# Patient Record
Sex: FEMALE | Race: WHITE | Employment: FULL TIME | URBAN - METROPOLITAN AREA
[De-identification: names, ages, dates, MRNs, and addresses within clinical notes are randomized per-mention and may not be internally consistent; named-entity substitution may affect disease eponyms.]

---

## 2022-05-04 ENCOUNTER — HOSPITAL ENCOUNTER (OUTPATIENT)
Dept: PHYSICAL THERAPY | Facility: CLINIC | Age: 72
Setting detail: THERAPIES SERIES
Discharge: HOME | End: 2022-05-04
Payer: MEDICARE

## 2022-05-04 PROCEDURE — 97162 PT EVAL MOD COMPLEX 30 MIN: CPT

## 2022-05-04 PROCEDURE — 97110 THERAPEUTIC EXERCISES: CPT

## 2022-05-04 NOTE — PRE-CERTIFICATION NOTE
Medicare Cap   [] Physical Therapy  [] Speech Therapy  [] Occupational therapy  *PT and Speech caps combine      $2150 Limit for PT and Speech combined  $2150 Limit for OT individually  At the beginning of the month where you expect to go over $2150, please add the [address] [place] [address] modifier      Patient Name: Maritza Groves  YOB: 1950    Note:  This is an estimate of charges billed.      Date of [address] Name # units/ charge $$$ charge Daily Total Charge Ongoing Total $$$   5/04/2022 REY Sykes 1,2 98.01+29.21+22.84 150.06 150.86

## 2022-05-04 NOTE — CONSULTS
[] [address]  [address]  [phone]  F: [phone] [] [address]  [facility]   [address]  P: [phone]  F: [phone] [] [place]  [address] [facility]  P: [phone]  F: [phone] [x] [address]: [phone]  F: [phone] [] [address]  [facility]   [address]   [place]: [phone]  F: [phone]      Physical Therapy Lower Extremity Evaluation    Date:  2022  Patient: Maritza Groves  : 1950  MRN: [id number]  Physician: Marvin Mcclain PA-C    Insurance: Medicare/ [facility] AARP Medicare Supplement (20% co-ins)  Medical Diagnosis: Closed displaced bimalleolar fracture of right lower leg    Rehab Codes: Right ankle pain (M25.571)  Onset date: 2022    Next Dr's appt.: Pending    Subjective:   CC: right ankle pain  HPI: The patient had a fall while walking her dogs on . She suffered a bimalleolar fracture which did not require surgery. She was non weight bearing for 10 weeks and wore a walking boot since mid march. She has been weaning out of it over the last 6 weeks but still has limited walking tolerance. She was walking up to 5 miles per day prior to her injury. She reports having periodic falls over the last year or two with increasing frequency.     PMHx: [] Unremarkable [] Diabetes [x] HTN  [] Pacemaker   [] MI/Heart Problems [] Cancer [] Arthritis [] Other:              [x] Refer to full medical chart  In EPIC       Comorbidities:   [] Obesity [] Dialysis  [] N/A   [] Asthma/COPD [] Dementia [] Other:   [] Stroke [] Sleep apnea [] Other:   [] Vascular disease [] Rheumatic disease [] Other:     Tests: [x] X-Ray: [] MRI:  [] Other:  Not done in the [facility]  Medications: [x] Refer to full medical record [] None [] Other:  Allergies:      [x] Refer to full medical record [] None [] Other:    Function:  Hand Dominance  [x] Right  [] Left  Patient lives with:    In what type of home []  One story   [x] Two story   [] Split level   Number of stairs to enter    With handrail on the [x]  Right to enter   [x] Left to enter   Bathroom has a []  Tub only  [x] Tub/shower combo   [] Walk in shower    []  Grab bars   Washing machine is on []  Main level   [] Second level   [x] [facility] Gerardo Rougon Status [x]  Normal duty   [] Light duty   [] Off due to condition    []  Retired   [] Not employed   [] Disability  [] Other:  []  Return to work:    Work activities/duties        ADL/IADL Previous level of function Current level of function Who currently assists the patient with task   Bathing  [x] Independent  [] Assist [x] Independent  [] Assist    Dress/grooming [x] Independent  [] Assist [x] Independent  [] Assist    Transfer/mobility [x] Independent  [] Assist [x] Independent  [] Assist    Feeding [x] Independent  [] Assist [x] Independent  [] Assist    Toileting [x] Independent  [] Assist [x] Independent  [] Assist    Driving [x] Independent  [] Assist [x] Independent  [] Assist    Housekeeping [x] Independent  [] Assist [x] Independent  [] Assist    Grocery shop/meal prep [x] Independent  [] Assist [x] Independent  [] Assist      Gait Prior level of function Current level of function    [x] Independent  [] Assist [x] Independent  [] Assist   Device: [x] Independent [x] Independent    [] Straight Cane [] Quad cane [] Straight Cane [] Quad cane    [] Standard walker [] Rolling walker   [] 4 wheeled walker [] Standard walker [] Rolling walker   [] 4 wheeled walker    [] Wheelchair [] Wheelchair     Pain:  [x] Yes  [] No Location:right ankle Pain Rating: (0-10 scale) 3/10  Pain altered Tx:  [] Yes  [x] No  Action:    Symptoms: [x] Improving [] Worsening [] Same  Better:  [] AM    [] PM    [] Sit    [] Rise/Sit    []Stand    [] Walk    [] Lying    [] Other:  Worse: [] AM    [] PM    [] Sit    [] Rise/Sit    []Stand    [] Walk    [] Lying    [] Bend                      [] Valsalva    [] Other:  Sleep: [x] OK    [] Disturbed    Objective:    ROM  ° A/P STRENGTH TESTS (+/-) Left Right Not Tested    Left Right Left Right Ant. Drawer   [x]   Hip Flex   5 5 Post. Drawer   [x]   Ext   4- 4- Lachmans   [x]   ER     Valgus Stress   [x]   IR     Varus Stress   [x]   ABD   4- 4- Chuckys   [x]   ADD     Apleys Comp.    [x]   Knee Flex   5 5 Apleys Dist.   [x]   Ext   5 5 Hip Scouring   [x]   Passive Bent knee DF 35 25 5 4- SAVIs   [x]    PF   5 5 Piriformis   [x]   INV   5 5 Stephanies   [x]   EVER   5 5 Talor Tilt   [x]        Pat-Fem Grind   [x]       OBSERVATION No Deficit Deficit Not Tested Comments   Posture    Mild effusion right ankle   Forward Head [] [x] []    Rounded Shoulders [] [x] []    Kyphosis [] [x] []    Lordosis [x] [] []    Lateral Shift [x] [] []    Scoliosis [] [] [x]    Iliac Crest [] [] [x]    PSIS [] [] [x]    ASIS [] [] [x]    Genu Valgus [] [] [x]    Genu Varus [] [] [x]    Genu Recurvatum [] [] [x]    Pronation [x] [] []    Supination [x] [] []    Leg Length Discrp [x] [] []    Slumped Sitting [] [] [x]    Palpation [] [x] [] Tenderness to lateral ankle   Sensation [x] [] []    Edema [] [x] [] Mild effusion   Neurological [x] [] []    Patellar Mobility [] [] [x]    Patellar Orientation [] [] [x]    Gait [] [x] [] Analysis: lacks push off on right       FUNCTION Normal Difficult Unable   Sitting [x] [] []   Standing [] [x] []   Ambulation [] [x] []   Groom/Dress [x] [] []   Lift/Carry [] [x] []   Stairs [] [x] []   Bending [] [x] []   Squat [] [x] []   Kneel [] [x] []     BALANCE/PROPRIOCEPTION              [] Not tested   Single leg stance       R                     L                                PAIN   Eyes open 3              Sec.     >15             Sec                  . []    Eyes closed                          Sec. Sec                  . []        FUNCTIONAL TESTS PAIN NO PAIN COMMENTS   Step Test 4 [] [x]    6 [] [x]    8 [] [x]    Squat [] [x]      Functional Test: LEFI Score: 44/80, 45% functionally impaired       Assessment:   The patient is a 73year old with a chief complaint of right ankle pain and signs and symptoms consistent with a diagnosis of right bimalleolar fracture. She presents with pain, hip and ankle weakness, decreased range of motion, decreased balance, a history of falls and functional limitations. She will benefit from skilled PT to address above deficits. Problems:    [x] ? Pain:  [x] ? ROM:  [x] ? Strength:  [x] ? Function:  [] Other:       STG: (to be met in 6 treatments)  1. ? Pain: 1-2/10  2. ? ROM: 30 degree standing DF  3. Patient to be independent with home exercise program as demonstrated by performance with correct form without cues. 4. Demonstrate Knowledge of fall prevention. LTG: (to be met in 16 treatments)  1. Patient will demonstrate S/L hold > 15 seconds on the right. 2. Patient will be able to walk her dogs > 30 minutes. 3. Patient will resume hiking on trials without any falls. 4. Patient will improve LEFS by > 10%                   Patient goals: To walk faster with less pain    Rehab Potential:  [x] Good  [] Fair  [] Poor   Suggested Professional Referral:  [x] No  [] Yes:  Barriers to Goal Achievement:  [x] No  [] Yes:  Domestic Concerns:  [x] No  [] Yes:    Pt. Education:  [x] Plans/Goals, Risks/Benefits discussed  [x] Home exercise program    Method of Education: [x] Verbal  [x] Demo  [x] Written  Comprehension of Education:  [x] Verbalizes understanding. [] Demonstrates understanding. [x] Needs Review. [] Demonstrates/verbalizes understanding of HEP/Ed previously given.     Treatment Plan:  [x] Therapeutic Exercise   44333  [] Iontophoresis: 4 mg/mL Dexamethasone Sodium Phosphate  mAmin  75576   [] Therapeutic Activity  70252 [x] Vasopneumatic cold with compression  04201    [] Gait Training   95025 [] Ultrasound   28450   [x] Neuromuscular Re-education  95140 [] Electrical Stimulation Unattended  82738   [x] Manual Therapy  19732 [] Electrical Stimulation Attended  08020   [x] Instruction in HEP  [] Lumbar/Cervical Traction  44829   [] Aquatic Therapy   64701 [] Cold/hotpack    [] Massage   40923      [] Dry Needling, 1 or 2 muscles  96156   [] Biofeedback, first 15 minutes   36191  [] Biofeedback, additional 15 minutes   48079 [] Dry Needling, 3 or more muscles  85966         Frequency:  2 x/week for 16 visits        Todays Treatment:  Modalities: PRN  Precautions:   Exercises:  Exercise Reps/ Time Weight/ Level Comments   Bridge x15     Clam x15 ea     Abduction x10     Gastroc stretch 2x20\"     Soleus stretch 2x20\"     Stair calf raise 2x15     3 way reach 2x5 ea                 Other:    HEP:  Bridge 2x15  Clam 2x15  Abduction 2x10  Gastroc stretch 2x20\"  Soleus stretch 2x20\"  Heel raise 2x15  3 way reach 2x5    Specific Instructions for next treatment: Progress hip and ankle strength, ankle ROM, functional strength and balance.       Evaluation Complexity:  History (Personal factors, comorbidities) [] 0 [x] 1-2 [] 3+   Exam (limitations, restrictions) [] 1-2 [x] 3 [] 4+   Clinical presentation (progression) [] Stable [x] Evolving  [] Unstable   Decision Making [] Low [x] Moderate [] High    [] Low Complexity [x] Moderate Complexity [] High Complexity       Treatment Charges: Mins Units   [x] Evaluation       []  Low       [x]  Moderate       []  High 20 1   []  Modalities     [x]  Ther Exercise 30 2   []  Manual Therapy     []  Ther Activities     []  Aquatics     []  Vasocompression     []  Other       TOTAL TREATMENT TIME: 50    Time in: 10:30 AM  Time Out:11:25 AM    Electronically signed by: Rajiv Moreno PT        Physician Signature:________________________________Date:__________________  By signing above or cosigning this note, I have reviewed this plan of care and certify a need for medically necessary rehabilitation services.      *PLEASE SIGN ABOVE AND FAX BACK ALL PAGES*

## 2022-05-04 NOTE — FLOWSHEET NOTE
[facility] Fall Risk Assessment    Patient Name:  Ivan Helms  : 1950    Risk Factor Scale  Score   History of Falls [x] Yes  [] No 25  0 25   Secondary Diagnosis [x] Yes  [] No 15  0 15   Ambulatory Aid [] Furniture  [] Crutches/cane/walker  [x] None/bedrest/wheelchair/nurse 30  15  0    IV/Heparin Lock [] Yes  [x] No 20  0    Gait/Transferring [] Impaired  [] Weak  [x] Normal/bedrest/immobile 20  10  0    Mental Status [] Forgets limitations  [x] Oriented to own ability 15  0       Total:40     Based on the Assessment score: check the appropriate box.     []  No intervention needed   Low =   Score of 0-24    [x]  Use standard prevention interventions Moderate =  Score of 24-44   [x] Give patient handout and discuss fall prevention strategies   [x] Establish goal of education for patient/family RE: fall prevention strategies    []  Use high risk prevention interventions High = Score of 45 and higher   [] Give patient handout and discuss fall prevention strategies   [] Establish goal of education for patient/family Re: fall prevention strategies   [] Discuss lifeline / other resources    Electronically signed by:   Duane Beam, PT  Date: 2022

## 2022-05-11 ENCOUNTER — HOSPITAL ENCOUNTER (OUTPATIENT)
Dept: PHYSICAL THERAPY | Facility: CLINIC | Age: 72
Setting detail: THERAPIES SERIES
Discharge: HOME | End: 2022-05-11
Payer: MEDICARE

## 2022-05-11 PROCEDURE — 97110 THERAPEUTIC EXERCISES: CPT

## 2022-05-11 PROCEDURE — 97112 NEUROMUSCULAR REEDUCATION: CPT

## 2022-05-11 NOTE — FLOWSHEET NOTE
[] [facility]) [facility]  [address]  [phone]  F: [phone] [] [address]  [place] [address]   [address]  P: [phone]  F: [phone] [] [place]  Therapy  [address]  P: [phone]  F: [phone] [x] [address]  P: [phone]  F: [phone] [] [address]  [facility]   [address]   [place]: [phone]  F: [phone]      Physical Therapy Daily Treatment Note    Date:  2022  Patient Name:  Trini Oliveros    :  1950  MRN: [id number]  Physician: Nikki Gautam PA-C                     Insurance: Medicare/ [facility] Medicare Supplement (20% co-ins)  Medical Diagnosis: Closed displaced bimalleolar fracture of right lower leg                        Rehab Codes: Right ankle pain (M25.571)  Visit# / total visits: ; Progress note for Medicare patient due at visit 10     Cancels/No Shows: 0    Subjective:    Pain:  [] Yes  [x] No Location:  N/A Pain Rating: (0-10 scale) 0/10  Pain altered Tx:  [x] No  [] Yes  Action:  Comments: Pt reports no adverse effects from eval.  She got a lace up brace which has been helpful.     Objective:  Modalities:   Precautions:  Exercises:  Exercise Reps/ Time Weight/ Level Comments   Bridge x15       Clam x15 ea       Abduction x10       Gastroc stretch 2x20\"       Soleus stretch 2x20\"       Stair calf raise 2x15       3 way reach 2x5 ea        Seated HS stretch 2 x 20\"       Foam marches 2 x 15     Gait training heel contact  2 x 20 ft  Mirror feedback   Gait training toe off 2 x 20 ft  Mirror feedback    Monster walk  2 x 20 steps  Orange     Other:     HEP:  Bridge 2x15  Clam 2x15  Abduction 2x10  Gastroc stretch 2x20\"  Soleus stretch 2x20\"  Heel raise 2x15  3 way reach 2x5      Treatment Charges: Mins Units   []  Modalities     [x]  Ther Exercise 35 2   []  Manual Therapy     []  Ther Activities     [x]  NMR 25 2   []  Vasocompression     []  Other     Total Treatment time 60 4       Assessment: [x] Progressing toward goals. The patient has continued balance deficits, hip and ankle weakness. Will continue with emphasis. All treatment was well tolerated today. [] No change. [] Other:  [x] Patient would continue to benefit from skilled physical therapy services in order to: The patient is a 73year old with a chief complaint of right ankle pain and signs and symptoms consistent with a diagnosis of right bimalleolar fracture. She presents with pain, hip and ankle weakness, decreased range of motion, decreased balance, a history of falls and functional limitations. She will benefit from skilled PT to address above deficits. STG/LTG:  STG: (to be met in 6 treatments)  1. ? Pain: 1-2/10   Met  2. ? ROM: 30 degree standing DF   Progressing toward goal  3. Patient to be independent with home exercise program as demonstrated by performance with correct form without cues. Progressing toward goal  4. Demonstrate Knowledge of fall prevention. Progressing toward goal     LTG: (to be met in 16 treatments)  1. Patient will demonstrate S/L hold > 15 seconds on the right. Progressing toward goal  2. Patient will be able to walk her dogs > 30 minutes. Progressing toward goal  3. Patient will resume hiking on trials without any falls. Progressing toward goal  4. Patient will improve LEFS by > 10%   Progressing toward goal       Pt. Education:  [x] Yes  [] No  [x] Reviewed Prior HEP/Ed  Method of Education: [x] Verbal  [x] Demo  [x] Written  Comprehension of Education:  [x] Verbalizes understanding. [] Demonstrates understanding. [] Needs review. [] Demonstrates/verbalizes HEP/Ed previously given.      Plan: [x] Continue current frequency toward long and short term goals. [] Specific Instructions for subsequent treatments:Progress hip and ankle strength, ankle ROM, functional strength and balance. Time In: 10:30            Time Out: 11:30    Electronically signed by:  Romie Fleischer, SPT      Evaluation/treatment performed by Student PT under the supervision of co-signing PT who agrees with all evaluation/treatment and documentation.

## 2022-05-11 NOTE — PRE-CERTIFICATION NOTE
Medicare Cap   [] Physical Therapy  [] Speech Therapy  [] Occupational therapy  *PT and Speech caps combine      $2150 Limit for PT and Speech combined  $2150 Limit for OT individually  At the beginning of the month where you expect to go over $2150, please add the [address] [place] modifier      Patient Name: Rapheal Lesch  YOB: 1950    Note:  This is an estimate of charges billed.      Date of [address] Name # units/ charge $$$ charge Daily Total Charge Ongoing Total $$$   5/04/2022 Eval,TE 1,2 98.01+29.21+22.84 150.06 150.86   5/11/2022 TE,NMR 2,2 33.82+26.02+22.84x2 105.52 256.38

## 2022-05-13 ENCOUNTER — HOSPITAL ENCOUNTER (OUTPATIENT)
Dept: PHYSICAL THERAPY | Facility: CLINIC | Age: 72
Setting detail: THERAPIES SERIES
Discharge: HOME | End: 2022-05-13
Payer: MEDICARE

## 2022-05-13 PROCEDURE — 97112 NEUROMUSCULAR REEDUCATION: CPT

## 2022-05-13 PROCEDURE — 97110 THERAPEUTIC EXERCISES: CPT

## 2022-05-13 NOTE — PRE-CERTIFICATION NOTE
Medicare Cap   [] Physical Therapy  [] Speech Therapy  [] Occupational therapy  *PT and Speech caps combine      $2150 Limit for PT and Speech combined  $2150 Limit for OT individually  At the beginning of the month where you expect to go over $2150, please add the [address] [place] 22 modifier      Patient Name: Sage Frances  YOB: 1950    Note:  This is an estimate of charges billed.      Date of [address] Name # units/ charge $$$ charge Daily Total Charge Ongoing Total $$$   5/04/2022 Eval,TE 1,2 98.01+29.21+22.84 150.06 150.86   5/11/2022 TE,NMR 2,2 33.82+26.02+22.84x2 105.52 256.38   5/13/22 TE, NMR 2,1 29.21+22.84+26.02 78.07 334.45

## 2022-05-13 NOTE — FLOWSHEET NOTE
[] [facility]) [facility]  [address]  [phone]  F: [phone] [] [address]  [place] [address]   [address]  P: [phone]  F: [phone] [] [place]  [facility]  [address]  P: [phone]  F: [phone] [x] [address]  P: [phone]  F: [phone] [] [address]  [facility]   [address]   [place]: [phone]  F: [phone]      Physical Therapy Daily Treatment Note    Date:  2022  Patient Name:  Myesha Cramer    :  1950  MRN: [id number]  Physician: Laverne Amin PA-C                     Insurance: Medicare/ [facility] AARP Medicare Supplement (20% co-ins)  Medical Diagnosis: Closed displaced bimalleolar fracture of right lower leg                        Rehab Codes: Right ankle pain (M25.571)  Visit# / total visits: 3/16; Progress note for Medicare patient due at visit 10     Cancels/No Shows: 0    Subjective:    Pain:  [] Yes  [x] No Location: R ankle Pain Rating: (0-10 scale) 0/10  Pain altered Tx:  [x] No  [] Yes  Action:  Comments: Pt reports mild fatigue in B hips after last visit, no c/o pain in R ankle. Pt reports compliance with HEP.      Objective:  Modalities:   Precautions:  Exercises:  Exercise Reps/ Time Weight/ Level Comments    Nustep  6' L5  x   Bridge 2x10     x   Clam 2x10   bilat  x   Abduction 2x10   bilat  x   Gastroc stretch 2x30\"     x   Soleus stretch 2x30\"     x   Stair calf raise 2x15     x   3 way reach 2x5    bilat  x    Seated HS stretch 2 x 20\"     x   Foam marches 2 x 15   x   Gait training heel contact  2 x 20 ft  Mirror feedback x   Gait training toe off 2 x 20 ft  Mirror feedback x    Monster walk  2 x20 steps  [place]   x   Walking marches  2x20 feet   x STS 10x  No UE support x   Other:     HEP:  Bridge 2x15  Clam 2x15  Abduction 2x10  Gastroc stretch 2x20\"  Soleus stretch 2x20\"  Heel raise 2x15  3 way reach 2x5      Treatment Charges: Mins Units   []  Modalities     [x]  Ther Exercise 30 2   []  Manual Therapy     []  Ther Activities     [x]  NMR 15 1   []  Vasocompression     []  Other     Total Treatment time 45 3       Assessment: [x] Progressing toward goals. Continued focusing on B hip strengthening as well as dynamic balance. Added STS without UE support for generalized strengthening with weakness noted in B LEs, however able to complete. Able to increase reps with mat exercises with good tolerance. Pt reports feeling good post interventions this date. [] No change. [] Other:  [x] Patient would continue to benefit from skilled physical therapy services in order to: The patient is a 73year old with a chief complaint of right ankle pain and signs and symptoms consistent with a diagnosis of right bimalleolar fracture. She presents with pain, hip and ankle weakness, decreased range of motion, decreased balance, a history of falls and functional limitations. She will benefit from skilled PT to address above deficits. STG/LTG:  STG: (to be met in 6 treatments)  1. ? Pain: 1-2/10   Met  2. ? ROM: 30 degree standing DF   Progressing toward goal  3. Patient to be independent with home exercise program as demonstrated by performance with correct form without cues. Progressing toward goal  4. Demonstrate Knowledge of fall prevention. Progressing toward goal     LTG: (to be met in 16 treatments)  1. Patient will demonstrate S/L hold > 15 seconds on the right. Progressing toward goal  2. Patient will be able to walk her dogs > 30 minutes. Progressing toward goal  3. Patient will resume hiking on trials without any falls. Progressing toward goal  4. Patient will improve LEFS by > 10%   Progressing toward goal       Pt.  Education:  [x] Yes [] No  [x] Reviewed Prior HEP/Ed  Method of Education: [x] Verbal  [x] Demo  [x] Written  Comprehension of Education:  [x] Verbalizes understanding. [] Demonstrates understanding. [] Needs review. [] Demonstrates/verbalizes HEP/Ed previously given. Plan: [x] Continue current frequency toward long and short term goals. [] Specific Instructions for subsequent treatments:Progress hip and ankle strength, ankle ROM, functional strength and balance.        Time In: 8:00am            Time Out: 8:45am    Electronically signed by:  Kelly Hollis PTA

## 2022-05-16 ENCOUNTER — HOSPITAL ENCOUNTER (OUTPATIENT)
Dept: PHYSICAL THERAPY | Facility: CLINIC | Age: 72
Setting detail: THERAPIES SERIES
Discharge: HOME | End: 2022-05-16
Payer: MEDICARE

## 2022-05-16 PROCEDURE — 97110 THERAPEUTIC EXERCISES: CPT

## 2022-05-16 PROCEDURE — 97112 NEUROMUSCULAR REEDUCATION: CPT

## 2022-05-16 NOTE — FLOWSHEET NOTE
[] [facility] [address] [facility]  [address]  [phone]  F: [phone] [] [address]  [facility]   [address]  P: [phone]  F: [phone] [] [place]  [facility]  [address]  P: [phone]  F: [phone] [x] [address]  P: [phone]  F: [phone] [] [address]  [facility]   [address]   [place]: [phone]  F: [phone]      Physical Therapy Daily Treatment Note    Date:  2022  Patient Name:  Dalila La    :  1950  MRN: [id number]  Physician: Lolsi Castro PA-C                     Insurance: Medicare/ [facility] AARP Medicare Supplement (20% co-ins)  Medical Diagnosis: Closed displaced bimalleolar fracture of right lower leg                        Rehab Codes: Right ankle pain (M25.571)   Visit# / total visits: ; Progress note for Medicare patient due at visit 10     Cancels/No Shows: 0    Subjective:    Pain:  [] Yes  [x] No Location: R ankle Pain Rating: (0-10 scale) 0/10  Pain altered Tx:  [x] No  [] Yes  Action:  Comments: Pt states she was able to go hiking with her ankle brace over the weekend with only minimal soreness in R ankle.      Objective:  Modalities:   Precautions:  Exercises:  Exercise Reps/ Time Weight/ Level Comments    Nustep  7' L5  x   Bridge 2x10     x   Clam 2x10   bilat  x   Abduction 2x10   bilat  x   Gastroc stretch 2x30\"     x   Soleus stretch 2x30\"     x   Stair calf raise 2x15     x   3 way reach 2x8    bilat  x   Standing HS stretch 2x30\"     x   Foam marches 2 x 15   x   Gait training heel contact  2 x 20 ft  Mirror feedback -   Gait training toe off 2 x 20 ft  Mirror feedback -   Monster walk 2x20 steps  Orange   x   STS 2x10  No UE support x SLS                     Other:     HEP:  Bridge 2x15  Clam 2x15  Abduction 2x10  Gastroc stretch 2x20\"  Soleus stretch 2x20\"  Heel raise 2x15  3 way reach 2x5      Treatment Charges: Mins Units   []  Modalities     [x]  Ther Exercise 30 2   []  Manual Therapy     []  Ther Activities     [x]  NMR 15 1   []  Vasocompression     []  Other     Total Treatment time 45 3       Assessment: [x] Progressing toward goals. Initiated tx with Nustep followed by B gastroc/soleus stretches. Pt then completed circuit type strengthening: 3 way reach, STS, monster walks, and HS stretches with good guanakito. Pt then took a short rest break and completed second circuit including stair calf raises, SLS, and march on foam. Ended tx with mat exercises; again focusing on hip strength. [] No change. [] Other:  [x] Patient would continue to benefit from skilled physical therapy services in order to: The patient is a 73year old with a chief complaint of right ankle pain and signs and symptoms consistent with a diagnosis of right bimalleolar fracture. She presents with pain, hip and ankle weakness, decreased range of motion, decreased balance, a history of falls and functional limitations. She will benefit from skilled PT to address above deficits. STG/LTG:  STG: (to be met in 6 treatments)  1. ? Pain: 1-2/10   Met  2. ? ROM: 30 degree standing DF   Progressing toward goal  3. Patient to be independent with home exercise program as demonstrated by performance with correct form without cues. Progressing toward goal  4. Demonstrate Knowledge of fall prevention. Progressing toward goal     LTG: (to be met in 16 treatments)  1. Patient will demonstrate S/L hold > 15 seconds on the right. Progressing toward goal  2. Patient will be able to walk her dogs > 30 minutes. Progressing toward goal  3. Patient will resume hiking on trials without any falls. Progressing toward goal  4.  Patient will improve LEFS by > 10%   Progressing toward goal       Pt. Education:  [x] Yes  [] No  [x] Reviewed Prior HEP/Ed  Method of Education: [x] Verbal  [x] Demo  [x] Written  Comprehension of Education:  [x] Verbalizes understanding. [] Demonstrates understanding. [] Needs review. [] Demonstrates/verbalizes HEP/Ed previously given. Plan: [x] Continue current frequency toward long and short term goals. [x] Specific Instructions for subsequent treatments:Progress hip and ankle strength, ankle ROM, functional strength and balance.        Time In: 8:00am            Time Out: 8:45am    Electronically signed by:  Flaco Waldrop PTA

## 2022-05-25 ENCOUNTER — HOSPITAL ENCOUNTER (OUTPATIENT)
Dept: PHYSICAL THERAPY | Facility: CLINIC | Age: 72
Setting detail: THERAPIES SERIES
Discharge: HOME | End: 2022-05-25
Payer: MEDICARE

## 2022-05-25 PROCEDURE — 97110 THERAPEUTIC EXERCISES: CPT

## 2022-05-25 PROCEDURE — 97112 NEUROMUSCULAR REEDUCATION: CPT

## 2022-05-25 NOTE — PRE-CERTIFICATION NOTE
Medicare Cap   [x] Physical Therapy  [] Speech Therapy  [] Occupational therapy  *PT and Speech caps combine      $2150 Limit for PT and Speech combined  $2150 Limit for OT individually  At the beginning of the month where you expect to go over $2150, please add the [address] [place] modifier      Patient Name: Cristel Rosaura: 1950    Note:  This is an estimate of charges billed.      Date of [address] Name # units/ charge $$$ charge Daily Total Charge Ongoing Total $$$   5/04/2022 Eval,TE 1,2 98.01+29.21+22.84 150.06 150.86   5/11/2022 TE,NMR 2,2 33.82+26.02+22.84x2 105.52 256.38   5/13/22 TE, NMR 2,1 29.21+22.84+26.02 78.07 334.45   5/16/22 TE, NMR 2,1 29.21+22.84+26.02 78.07 412.52   5/25/22 TE, NMR 3, 1 33.82+22.84+22.84+22.84 102.34 514.86

## 2022-05-25 NOTE — FLOWSHEET NOTE
[] [facility]) [facility]  [facility]  [address]  [phone]  F: [phone] [] [address]  [place] [address]   [address]  P: [phone]  F: [phone] [] [place]  [facility]  [address]  P: [phone]  F: [phone] [x] [address]  P: [phone]  F: [phone] [] [address]  [facility]   [address]   [place]: [phone]  F: [phone]      Physical Therapy Daily Treatment Note    Date:  2022  Patient Name:  Dolores Scherer    :  1950  MRN: [id number]  Physician: Que Hein PA-C                     Insurance: Medicare/ [facility] AAR Medicare Supplement (20% co-ins)  Medical Diagnosis: Closed displaced bimalleolar fracture of right lower leg                        Rehab Codes: Right ankle pain (M25.571)   Visit# / total visits: ; Progress note for Medicare patient due at visit 10     Cancels/No Shows: 0    Subjective:    Pain:  [] Yes  [x] No Location: R ankle Pain Rating: (0-10 scale) 0/10  Pain altered Tx:  [x] No  [] Yes  Action:  Comments: Pt states she was able to go hiking during her trip with use of brace and walking stick. Pt was able to walk up to 7 miles a day during her trip.     Objective:  Modalities:   Precautions:  Exercises:  Exercise Reps/ Time Weight/ Level Comments    Nustep  7' L5  x   Bridge 2x10     x   Clam 2x10   bilat  x   Abduction 2x10   bilat  x   Gastroc stretch 2x30\"     x   Soleus stretch 2x30\"     x   Stair calf raise 2x15     x   3 way reach 2x8    bilat     Standing HS stretch 2x30\"     x   Foam marches 2 x 15   x   Hip abd 2 x 15  On foam  x   Hip ext 2 x 15  On foam x          Gait training heel contact  2 x 20 ft  Mirror feedback -   Gait training toe off 2 x 20 ft  Mirror feedback -   Monster walk 2x20 steps  Orange   x   Banded lateral walks 2 x 20 steps Orange  x   STS 2x10  No UE support    SLS                     Other: Standing posterior talus mob grade IV with strap and right foot on step     HEP:  Bridge 2x15  Clam 2x15  Abduction 2x10  Gastroc stretch 2x20\"  Soleus stretch 2x20\"  Heel raise 2x15  3 way reach 2x5      Treatment Charges: Mins Units   []  Modalities     [x]  Ther Exercise 42 3   [x]  Manual Therapy 3 NC   []  Ther Activities     [x]  NMR 15 1   []  Vasocompression     []  Other     Total Treatment time 60 4       Assessment: [x] Progressing toward goals. Pt tolerated treatment well with no pain. Addition of banded lateral walks and hip strengthening on foam to increase strength and increase balance. Addition of posterior talus mob to increase DF as pt reports difficulty with descending stairs. [] No change. [] Other:  [x] Patient would continue to benefit from skilled physical therapy services in order to: The patient is a 73year old with a chief complaint of right ankle pain and signs and symptoms consistent with a diagnosis of right bimalleolar fracture. She presents with pain, hip and ankle weakness, decreased range of motion, decreased balance, a history of falls and functional limitations. She will benefit from skilled PT to address above deficits. STG/LTG:  STG: (to be met in 6 treatments)  1. ? Pain: 1-2/10   Met  2. ? ROM: 30 degree standing DF   Progressing toward goal  3. Patient to be independent with home exercise program as demonstrated by performance with correct form without cues. Progressing toward goal  4. Demonstrate Knowledge of fall prevention. Progressing toward goal     LTG: (to be met in 16 treatments)  1. Patient will demonstrate S/L hold > 15 seconds on the right. Progressing toward goal  2. Patient will be able to walk her dogs > 30 minutes. Progressing toward goal  3.  Patient will resume hiking on trials without any falls. Progressing toward goal  4. Patient will improve LEFS by > 10%   Progressing toward goal       Pt. Education:  [x] Yes  [] No  [x] Reviewed Prior HEP/Ed  Method of Education: [x] Verbal  [x] Demo  [x] Written  Comprehension of Education:  [x] Verbalizes understanding. [] Demonstrates understanding. [] Needs review. [] Demonstrates/verbalizes HEP/Ed previously given. Plan: [x] Continue current frequency toward long and short term goals. [x] Specific Instructions for subsequent treatments:Progress hip and ankle strength, ankle ROM, functional strength and balance. Time In: 10:30am            Time Out: 11:30am    Electronically signed by:  DAYNE Nunez      Evaluation/treatment performed by Student PT under the supervision of co-signing PT who agrees with all evaluation/treatment and documentation.

## 2022-05-27 ENCOUNTER — HOSPITAL ENCOUNTER (OUTPATIENT)
Dept: PHYSICAL THERAPY | Facility: CLINIC | Age: 72
Setting detail: THERAPIES SERIES
Discharge: HOME | End: 2022-05-27
Payer: MEDICARE

## 2022-05-27 PROCEDURE — 97110 THERAPEUTIC EXERCISES: CPT

## 2022-05-27 PROCEDURE — 97112 NEUROMUSCULAR REEDUCATION: CPT

## 2022-05-27 NOTE — FLOWSHEET NOTE
[] [address]  [address]  [phone]  F: [phone] [] [address]  [facility]   [address]  P: [phone]  F: [phone] [] [address]  [address]  P: [phone]  F: [phone] [x] [address]  P: [phone]  F: [phone] [] [address]  [facility]   [address]   [place]: [phone]  F: [phone]      Physical Therapy Daily Treatment Note    Date:  2022  Patient Name:  Nancy Romano    :  1950  MRN: [id number]  Physician: Cam Young PA-C                     Insurance: Medicare/ [facility] AARP Medicare Supplement (20% co-ins)  Medical Diagnosis: Closed displaced bimalleolar fracture of right lower leg                        Rehab Codes: Right ankle pain (M25.571)   Visit# / total visits: ; Progress note for Medicare patient due at visit 10     Cancels/No Shows: 0    Subjective:    Pain:  [] Yes  [x] No Location: R ankle Pain Rating: (0-10 scale) 0/10  Pain altered Tx:  [x] No  [] Yes  Action:  Comments: Pt denies pain upon arrival.      Objective:  Modalities:   Precautions:  Exercises:  Exercise Reps/ Time Weight/ Level Comments    Nustep  7' L5  x   Bridge 2x10     -   Clam 2x10   bilat  -   Abduction 2x10   bilat  -   Gastroc stretch 2x30\"     x   Soleus stretch 2x30\"     x   Stair calf raise 2x15     x   3 way reach 2x8    bilat     Standing HS stretch 2x30\"     x   Foam marches 2 x 15   x   Hip abd 2 x 15  On foam  x   Hip ext 2 x 15  On foam x          Gait training heel contact  2 x 20 ft  Mirror feedback -   Gait training toe off 2 x 20 ft  Mirror feedback -   Monster walk 2x20 steps  Orange   x   Banded lateral walks 2 x 20 steps Rhea  x   STS 2x10  No UE support    SLS 3x30\"   x                 Other: Standing posterior talus mob grade III with strap and right foot on step; long sitting distraction and A/P Grade III mobes      HEP:  Bridge 2x15  Clam 2x15  Abduction 2x10  Gastroc stretch 2x20\"  Soleus stretch 2x20\"  Heel raise 2x15  3 way reach 2x5      Treatment Charges: Mins Units   []  Modalities     [x]  Ther Exercise 25 2   [x]  Manual Therapy 3 NC   []  Ther Activities     [x]  NMR 10 1   []  Vasocompression     []  Other     Total Treatment time 38 3       Assessment: [x] Progressing toward goals. Continued with Talocrual joint mobilizations with slight improvement descending 6\" stairs in clinic. Pt then completed hip strengthening exercises; able to increase to lime tband with lateral band walks and monster walks. Continued with static and dynamic balance and proprioception exercises with good tolerance. Pt continues to demo decreased balance on R with SLS compared to L.     [] No change. [] Other:  [x] Patient would continue to benefit from skilled physical therapy services in order to: The patient is a 73year old with a chief complaint of right ankle pain and signs and symptoms consistent with a diagnosis of right bimalleolar fracture. She presents with pain, hip and ankle weakness, decreased range of motion, decreased balance, a history of falls and functional limitations. She will benefit from skilled PT to address above deficits. STG/LTG:  STG: (to be met in 6 treatments)  1. ? Pain: 1-2/10   Met  2. ? ROM: 30 degree standing DF   Progressing toward goal  3. Patient to be independent with home exercise program as demonstrated by performance with correct form without cues. Progressing toward goal  4. Demonstrate Knowledge of fall prevention. Progressing toward goal     LTG: (to be met in 16 treatments)  1. Patient will demonstrate S/L hold > 15 seconds on the right. Progressing toward goal  2.  Patient will be able to walk her dogs > 30 minutes. Progressing toward goal  3. Patient will resume hiking on trials without any falls. Progressing toward goal  4. Patient will improve LEFS by > 10%   Progressing toward goal       Pt. Education:  [x] Yes  [] No  [x] Reviewed Prior HEP/Ed  Method of Education: [x] Verbal  [x] Demo  [x] Written  Comprehension of Education:  [x] Verbalizes understanding. [] Demonstrates understanding. [] Needs review. [] Demonstrates/verbalizes HEP/Ed previously given. Plan: [x] Continue current frequency toward long and short term goals. [x] Specific Instructions for subsequent treatments:Progress hip and ankle strength, ankle ROM, functional strength and balance. Time In: 8:00am            Time Out: 8:40am    Electronically signed by:  Sadiq Edouard PTA      Evaluation/treatment performed by Student PT under the supervision of co-signing PT who agrees with all evaluation/treatment and documentation.

## 2022-06-01 ENCOUNTER — HOSPITAL ENCOUNTER (OUTPATIENT)
Dept: PHYSICAL THERAPY | Facility: CLINIC | Age: 72
Setting detail: THERAPIES SERIES
Discharge: HOME | End: 2022-06-01
Payer: MEDICARE

## 2022-06-01 PROCEDURE — 97112 NEUROMUSCULAR REEDUCATION: CPT

## 2022-06-01 PROCEDURE — 97110 THERAPEUTIC EXERCISES: CPT

## 2022-06-01 NOTE — FLOWSHEET NOTE
[] [address]  [address]  [phone]  F: [phone] [] [address]  [facility]   [address]  P: [phone]  F: [phone] [] [place] [address]  Therapy  [address] [facility]  P: [phone]  F: [phone] [x] [address]  P: [phone]  F: [phone] [] [address]  [facility]   [address]   [place]: [phone]  F: [phone]      Physical Therapy Daily Treatment Note    Date:  2022  Patient Name:  Kira Olmos    :  1950  MRN: [id number]  Physician: Moy Hernandez PA-C                     Insurance: Medicare/ [facility] AARP Medicare Supplement (20% co-ins)  Medical Diagnosis: Closed displaced bimalleolar fracture of right lower leg                        Rehab Codes: Right ankle pain (M25.571)   Visit# / total visits: ; Progress note for Medicare patient due at visit 10     Cancels/No Shows: 0    Subjective:    Pain:  [] Yes  [x] No Location: R ankle Pain Rating: (0-10 scale) 2/10  Pain altered Tx:  [x] No  [] Yes  Action:  Comments: Pt reports pain in right ankle that she attributes to prolong time on feet while gardening this weekend.     Objective:  Modalities:   Precautions:  Exercises:  Exercise Reps/ Time Weight/ Level Comments    Nustep  7' L5  x   Bridge 2x10     -   Clam 2x10   bilat  -   Abduction 2x10   bilat  -   Gastroc stretch 2x30\"     x   Soleus stretch 2x30\"     x   Stair calf raise 2x15     x   3 way reach 2x5   bilat  x   Standing HS stretch 2x30\"     x   Foam marches x 15  On foam x   Hip abd x 15  On foam  x   Hip ext x 15  On foam x          Gait training heel contact  2 x 20 ft  Mirror feedback -   Gait training toe off 2 x 20 ft  Mirror feedback -   Monster walk 2x20 steps  Orange   x   Banded lateral walks 2 x 20 steps Orange  x   STS 2x10  No UE support    SLS 2x30\"   x   Lunge onto bosu 2 x 10  bilat x          Other: Standing posterior talus mob grade III with strap and right foot on step; long sitting distraction and A/P Grade III mobes      HEP:  Bridge 2x15  Clam 2x15  Abduction 2x10  Gastroc stretch 2x20\"  Soleus stretch 2x20\"  Heel raise 2x15  3 way reach 2x5      Treatment Charges: Mins Units   []  Modalities     [x]  Ther Exercise 35 2   []  Manual Therapy     []  Ther Activities     [x]  NMR 10 1   []  Vasocompression     []  Other     Total Treatment time 45 3       Assessment: [x] Progressing toward goals. Pt tolerated treatment well with no pain throughout. Addition of lunge onto bosu to increase ankle stability. Continued with LE strengthening and balance activities. [] No change. [] Other:  [x] Patient would continue to benefit from skilled physical therapy services in order to: The patient is a 73year old with a chief complaint of right ankle pain and signs and symptoms consistent with a diagnosis of right bimalleolar fracture. She presents with pain, hip and ankle weakness, decreased range of motion, decreased balance, a history of falls and functional limitations. She will benefit from skilled PT to address above deficits. STG/LTG:  STG: (to be met in 6 treatments)  1. ? Pain: 1-2/10   Met  2. ? ROM: 30 degree standing DF   Progressing toward goal  3. Patient to be independent with home exercise program as demonstrated by performance with correct form without cues. Progressing toward goal  4. Demonstrate Knowledge of fall prevention. Progressing toward goal     LTG: (to be met in 16 treatments)  1. Patient will demonstrate S/L hold > 15 seconds on the right. Progressing toward goal  2. Patient will be able to walk her dogs > 30 minutes. Progressing toward goal  3.  Patient will resume hiking on trials without any falls.   Progressing toward goal  4. Patient will improve LEFS by > 10%   Progressing toward goal       Pt. Education:  [x] Yes  [] No  [x] Reviewed Prior HEP/Ed  Method of Education: [x] Verbal  [x] Demo  [x] Written  Comprehension of Education:  [x] Verbalizes understanding. [] Demonstrates understanding. [] Needs review. [] Demonstrates/verbalizes HEP/Ed previously given. Plan: [x] Continue current frequency toward long and short term goals. [x] Specific Instructions for subsequent treatments:Progress hip and ankle strength, ankle ROM, functional strength and balance. Time In: 10:30am            Time Out: 11:15am    Electronically signed by:  DAYNE Thomas      Evaluation/treatment performed by Student PT under the supervision of co-signing PT who agrees with all evaluation/treatment and documentation.

## 2022-06-01 NOTE — PRE-CERTIFICATION NOTE
Medicare Cap   [x] Physical Therapy  [] Speech Therapy  [] Occupational therapy  *PT and Speech caps combine      $2150 Limit for PT and Speech combined  $2150 Limit for OT individually  At the beginning of the month where you expect to go over $2150, please add the [address] [place] modifier      Patient Name: Wilian Edmond: 1950    Note:  This is an estimate of charges billed.      Date of [address] Name # units/ charge $$$ charge Daily Total Charge Ongoing Total $$$   2022 Eval,TE 1,2 98.01+29.21+22.84 150.06 150.86   2022 TE,NMR 2,2 33.82+26.02+22.84x2 105.52 256.38   22 TE, NMR 2,1 29.21+22.84+26.02 78.07 334.45   22 TE, NMR 2,1 29.21+22.84+26.02 78.07 412.52   22 TE, NMR 3, 1 33.82+22.84+22.84+22.84 102.34 514.86   22 TE, NMR 2+1 29.21+22.84+26.02 78.07 592.93   22 TE, NMR 2+1 29.21+22.84+26.02 78.07 671.00

## 2022-06-03 ENCOUNTER — HOSPITAL ENCOUNTER (OUTPATIENT)
Dept: PHYSICAL THERAPY | Facility: CLINIC | Age: 72
Setting detail: THERAPIES SERIES
Discharge: HOME | End: 2022-06-03
Payer: MEDICARE

## 2022-06-03 PROCEDURE — 97110 THERAPEUTIC EXERCISES: CPT

## 2022-06-03 PROCEDURE — 97112 NEUROMUSCULAR REEDUCATION: CPT

## 2022-06-03 NOTE — PRE-CERTIFICATION NOTE
Medicare Cap   [x] Physical Therapy  [] Speech Therapy  [] Occupational therapy  *PT and Speech caps combine      $2150 Limit for PT and Speech combined  $2150 Limit for OT individually  At the beginning of the month where you expect to go over $2150, please add the [address] [place] 22 modifier      Patient Name: Cristel Rosaura: 1950    Note:  This is an estimate of charges billed.      Date of [address] Name # units/ charge $$$ charge Daily Total Charge Ongoing Total $$$   5/04/2022 Eval,TE 1,2 98.01+29.21+22.84 150.06 150.86   5/11/2022 TE,NMR 2,2 33.82+26.02+22.84x2 105.52 256.38   5/13/22 TE, NMR 2,1 29.21+22.84+26.02 78.07 334.45   5/16/22 TE, NMR 2,1 29.21+22.84+26.02 78.07 412.52   5/25/22 TE, NMR 3, 1 33.82+22.84+22.84+22.84 102.34 514.86   5/27/22 TE, NMR 2+1 29.21+22.84+26.02 78.07 592.93   6/1/22 TE, NMR 2+1 29.21+22.84+26.02 78.07 671.00   6/3 TE, NMR 2+1  78.07 749.07

## 2022-06-03 NOTE — FLOWSHEET NOTE
[] [facility] [address]  [address]  [phone]  F: [phone] [] [address]  [facility]   [address]  P: [phone]  F: [phone] [] [place]  [facility]  [address]  P: [phone]  F: [phone] [x] [address]  P: [phone]  F: [phone] [] [address]  [facility]   [address]   [place]: [phone]  F: [phone]      Physical Therapy Daily Treatment Note    Date:  6/3/2022  Patient Name:  Deisy Torres    :  1950  MRN: [id number]  Physician: Javan Barajas PA-C                     Insurance: Medicare/ [facility] AARP Medicare Supplement (20% co-ins)  Medical Diagnosis: Closed displaced bimalleolar fracture of right lower leg                        Rehab Codes: Right ankle pain (M25.571)   Visit# / total visits: ; Progress note for Medicare patient due at visit 10     Cancels/No Shows: 0    Subjective:    Pain:  [] Yes  [x] No Location: R ankle Pain Rating: (0-10 scale) 2/10  Pain altered Tx:  [x] No  [] Yes  Action:  Comments: Pt reported that she is doing well and that her ankle has minor pain and stated that she was gardening before appt.      Objective:  Modalities:   Precautions:  Exercises:  Exercise Reps/ Time Weight/ Level Comments    Nustep  7' L5  x   Bridge 2x10     -   Clam 2x10   bilat  -   Abduction 2x10   bilat  -   Gastroc stretch 2x30\"     x   Soleus stretch 2x30\"     x   Stair calf raise 2x15     x   3 way reach 2x5   bilat  x   Standing HS stretch 2x30\"     x   Foam marches 15x2  On foam x   Hip abd 15x2  On foam  x   Hip ext 15x2  On foam x          Gait training heel contact  2 x 20 ft  Mirror feedback -   Gait training toe off 2 x 20 ft  Mirror feedback - Monster walk 2x20 steps  Orange   x   Banded lateral walks 2 x 20 steps Orange  x   STS 2x10  No UE support    SLS 2x30\"   x   Lunge onto bosu 2 x 10  bilat x          Other: Standing posterior talus mob grade III with strap and right foot on step; long sitting distraction and A/P Grade III mobes      HEP:  Bridge 2x15  Clam 2x15  Abduction 2x10  Gastroc stretch 2x20\"  Soleus stretch 2x20\"  Heel raise 2x15  3 way reach 2x5      Treatment Charges: Mins Units   []  Modalities     [x]  Ther Exercise 30 2   []  Manual Therapy     []  Ther Activities     [x]  NMR 10 1   []  Vasocompression     []  Other     Total Treatment time 40 3       Assessment: [x] Progressing toward goals. Pt with good tolerance to progressions with no reported increase in pain. Progressed reps for standing activities to improve LE strength and endurance. [] No change. [] Other:  [x] Patient would continue to benefit from skilled physical therapy services in order to: The patient is a 73year old with a chief complaint of right ankle pain and signs and symptoms consistent with a diagnosis of right bimalleolar fracture. She presents with pain, hip and ankle weakness, decreased range of motion, decreased balance, a history of falls and functional limitations. She will benefit from skilled PT to address above deficits. STG/LTG:  STG: (to be met in 6 treatments)  1. ? Pain: 1-2/10   Met  2. ? ROM: 30 degree standing DF   Progressing toward goal  3. Patient to be independent with home exercise program as demonstrated by performance with correct form without cues. Progressing toward goal  4. Demonstrate Knowledge of fall prevention. Progressing toward goal     LTG: (to be met in 16 treatments)  1. Patient will demonstrate S/L hold > 15 seconds on the right. Progressing toward goal  2. Patient will be able to walk her dogs > 30 minutes. Progressing toward goal  3.  Patient will resume hiking on trials without any falls.   Progressing toward goal  4. Patient will improve LEFS by > 10%   Progressing toward goal       Pt. Education:  [x] Yes  [] No  [x] Reviewed Prior HEP/Ed  Method of Education: [x] Verbal  [x] Demo  [x] Written  Comprehension of Education:  [x] Verbalizes understanding. [] Demonstrates understanding. [] Needs review. [] Demonstrates/verbalizes HEP/Ed previously given. Plan: [x] Continue current frequency toward long and short term goals. [x] Specific Instructions for subsequent treatments:Progress hip and ankle strength, ankle ROM, functional strength and balance.        Time In: 3204            Time Out: 3143    Electronically signed by:  Cristhian Carrillo PTA

## 2022-06-06 ENCOUNTER — HOSPITAL ENCOUNTER (OUTPATIENT)
Dept: PHYSICAL THERAPY | Facility: CLINIC | Age: 72
Setting detail: THERAPIES SERIES
Discharge: HOME | End: 2022-06-06
Payer: MEDICARE

## 2022-06-06 PROCEDURE — 97112 NEUROMUSCULAR REEDUCATION: CPT

## 2022-06-06 PROCEDURE — 97110 THERAPEUTIC EXERCISES: CPT

## 2022-06-06 NOTE — FLOWSHEET NOTE
[] [address]  [address]  [phone]  F: [phone] [] [address]  [place] [address]   [address]  P: [phone]  F: [phone] [] [place]  [facility]  [address]  P: [phone]  F: [phone] [x] [address]  P: [phone]  F: [phone] [] [address]  [facility]   [address]   [place]: [phone]  F: [phone]      Physical Therapy Daily Treatment Note    Date:  2022  Patient Name:  Ludy Amaya    :  1950  MRN: [id number]  Physician: Karsten Arteaga PA-C                     Insurance: Medicare/ [facility] AARP Medicare Supplement (20% co-ins)  Medical Diagnosis: Closed displaced bimalleolar fracture of right lower leg                        Rehab Codes: Right ankle pain (M25.571)   Visit# / total visits: ; Progress note for Medicare patient due at visit 10     Cancels/No Shows: 0    Subjective:    Pain:  [] Yes  [x] No Location: R ankle Pain Rating: (0-10 scale) 0/10  Pain altered Tx:  [x] No  [] Yes  Action:  Comments: Pt states she had some pain in lateral ankle that lasted a few hours. Pt reports she is going hiking this week and bought higher boots and a walking stick to utilize.      Objective:  Modalities:   Precautions:  Exercises:  Exercise Reps/ Time Weight/ Level Comments    Nustep  5' L6  x   Bridge 2x10     -   Clam 2x10   bilat  -   Abduction 2x10   bilat  -   Gastroc stretch 2x30\"     x   Soleus stretch 2x30\"     x   Stair calf raise 2x15     x   3 way reach 2x5   bilat  x   Standing HS stretch 2x30\"     x   Foam marches x15  On foam x   Hip abd x15  On foam  x   Hip ext x15  On foam x          Gait training heel contact  2 x 20 ft  Mirror feedback -   Gait training toe off 2 x 20 ft  Mirror feedback -   Monster walk 2x20 steps  Orange   x   Banded lateral walks 2 x 20 steps Orange  x   STS 2x10  No UE support    SLS 2x30\"   x   Lunge onto bosu 2 x 10  bilat x   Lunges x5 ea  In // bars x          Dynamic warm up       Knee hug 2x20'   x   HS scoop 2x20'   x   March 2x20'   x   Side lunge 2x20'   x          Other: Standing posterior talus mob grade III with strap and right foot on step; long sitting distraction and A/P Grade III mobes      HEP:  Bridge 2x15  Clam 2x15  Abduction 2x10  Gastroc stretch 2x20\"  Soleus stretch 2x20\"  Heel raise 2x15  3 way reach 2x5    Access Code: [email or web address]  URL: [email or web address]  Date: 06/06/2022  Prepared by: Stevenson Fuller    Exercises  Single Knee to Chest - 1 x daily - 7 x weekly - 1 sets - 10 reps  Walking hamstring sweep - 1 x daily - 7 x weekly - 1 sets - 10 reps  Lateral Lunge - 1 x daily - 7 x weekly - 1 sets - 10 reps  Walking March - 1 x daily - 7 x weekly - 1 sets - 10 reps      Treatment Charges: Mins Units   []  Modalities     [x]  Ther Exercise 30 2   []  Manual Therapy     []  Ther Activities     [x]  NMR 10 1   []  Vasocompression     []  Other     Total Treatment time 40 3       Assessment: [x] Progressing toward goals. Addition of dynamic warm up as patient reports difficulty when starting to walk that improves with ambulation. Addition of lunges as pt reports decreased stride length. Pt tolerated treatment well without pain. Continued to work on balance activities and strengthening. [] No change. [] Other:  [x] Patient would continue to benefit from skilled physical therapy services in order to: The patient is a 73year old with a chief complaint of right ankle pain and signs and symptoms consistent with a diagnosis of right bimalleolar fracture. She presents with pain, hip and ankle weakness, decreased range of motion, decreased balance, a history of falls and functional limitations.   She will benefit from skilled PT to address above deficits. STG/LTG:  STG: (to be met in 6 treatments)  1. ? Pain: 1-2/10   Met  2. ? ROM: 30 degree standing DF   Progressing toward goal  3. Patient to be independent with home exercise program as demonstrated by performance with correct form without cues. Progressing toward goal  4. Demonstrate Knowledge of fall prevention. Progressing toward goal     LTG: (to be met in 16 treatments)  1. Patient will demonstrate S/L hold > 15 seconds on the right. Progressing toward goal  2. Patient will be able to walk her dogs > 30 minutes. Progressing toward goal  3. Patient will resume hiking on trials without any falls. Progressing toward goal  4. Patient will improve LEFS by > 10%   Progressing toward goal       Pt. Education:  [x] Yes  [] No  [x] Reviewed Prior HEP/Ed  Method of Education: [x] Verbal  [x] Demo  [x] Written  Comprehension of Education:  [x] Verbalizes understanding. [] Demonstrates understanding. [] Needs review. [] Demonstrates/verbalizes HEP/Ed previously given. Plan: [x] Continue current frequency toward long and short term goals. [x] Specific Instructions for subsequent treatments:Progress hip and ankle strength, ankle ROM, functional strength and balance. Time In: 17:15            Time Out: 17:55    Electronically signed by:  DAYNE Karimi        Evaluation/treatment performed by Student PT under the supervision of co-signing PT who agrees with all evaluation/treatment and documentation.

## 2022-06-06 NOTE — PRE-CERTIFICATION NOTE
Medicare Cap   [x] Physical Therapy  [] Speech Therapy  [] Occupational therapy  *PT and Speech caps combine      $2150 Limit for PT and Speech combined  $2150 Limit for OT individually  At the beginning of the month where you expect to go over $2150, please add the [address] [place] [address] modifier      Patient Name: Nellie Samples: 1950    Note:  This is an estimate of charges billed.      Date of [address] Name # units/ charge $$$ charge Daily Total Charge Ongoing Total $$$   5/04/2022 Eval,TE 1,2 98.01+29.21+22.84 150.06 150.86   5/11/2022 TE,NMR 2,2 33.82+26.02+22.84x2 105.52 256.38   5/13/22 TE, NMR 2,1 29.21+22.84+26.02 78.07 334.45   5/16/22 TE, NMR 2,1 29.21+22.84+26.02 78.07 412.52   5/25/22 TE, NMR 3, 1 33.82+22.84+22.84+22.84 102.34 514.86   5/27/22 TE, NMR 2+1 29.21+22.84+26.02 78.07 592.93   6/1/22 TE, NMR 2+1 29.21+22.84+26.02 78.07 671.00   6/3 TE, NMR 2+1  78.07 749.07   6/6/22 TE, NMR 2+1 29.21+22.84+26.02 78.07 827.14

## 2022-06-08 ENCOUNTER — APPOINTMENT (OUTPATIENT)
Dept: PHYSICAL THERAPY | Facility: CLINIC | Age: 72
End: 2022-06-08
Payer: MEDICARE

## 2022-06-14 ENCOUNTER — HOSPITAL ENCOUNTER (OUTPATIENT)
Dept: PHYSICAL THERAPY | Facility: CLINIC | Age: 72
Setting detail: THERAPIES SERIES
Discharge: HOME | End: 2022-06-14
Payer: MEDICARE

## 2022-06-14 PROCEDURE — 97112 NEUROMUSCULAR REEDUCATION: CPT

## 2022-06-14 PROCEDURE — 97110 THERAPEUTIC EXERCISES: CPT

## 2022-06-14 NOTE — PRE-CERTIFICATION NOTE
Medicare Cap   [x] Physical Therapy  [] Speech Therapy  [] Occupational therapy  *PT and Speech caps combine      $2150 Limit for PT and Speech combined  $2150 Limit for OT individually  At the beginning of the month where you expect to go over $2150, please add the [address] [place] modifier      Patient Name: Froy Hansen: 1950    Note:  This is an estimate of charges billed.      Date of [address] Name # units/ charge $$$ charge Daily Total Charge Ongoing Total $$$   5/04/2022 Eval,TE 1,2 98.01+29.21+22.84 150.06 150.86   5/11/2022 TE,NMR 2,2 33.82+26.02+22.84x2 105.52 256.38   5/13/22 TE, NMR 2,1 29.21+22.84+26.02 78.07 334.45   5/16/22 TE, NMR 2,1 29.21+22.84+26.02 78.07 412.52   5/25/22 TE, NMR 3, 1 33.82+22.84+22.84+22.84 102.34 514.86   5/27/22 TE, NMR 2+1 29.21+22.84+26.02 78.07 592.93   6/1/22 TE, NMR 2+1 29.21+22.84+26.02 78.07 671.00   6/3 TE, NMR 2+1  78.07 749.07   6/6/22 TE, NMR 2+1 29.21+22.84+26.02 78.07 827.14   6/14/22 TE, NMR 2+1 29.21+22.84+26.02 78.07 905.21

## 2022-06-14 NOTE — FLOWSHEET NOTE
[] [facility]) - [facility] &  Therapy  [address]  [phone]  F: [phone] [] [address]  [facility] [address]   [address]  P: [phone]  F: [phone] [] [place]  [facility]  [address]  P: [phone]  F: [phone] [x] [address]  P: [phone]  F: [phone] [] [address]  [facility]   [address]   [place]: [phone]  F: [phone]      Physical Therapy Daily Treatment Note    Date:  2022  Patient Name:  Dalila La    :  1950  MRN: [id number]  Physician: Lolis Castro PA-C                     Insurance: Medicare/ [facility] AARP Medicare Supplement (20% co-ins)  Medical Diagnosis: Closed displaced bimalleolar fracture of right lower leg                        Rehab Codes: Right ankle pain (M25.571)   Visit# / total visits: ; Progress note for Medicare patient due at visit 10     Cancels/No Shows: 0    Subjective:    Pain:  [] Yes  [x] No Location: R ankle Pain Rating: (0-10 scale) 0/10  Pain altered Tx:  [x] No  [] Yes  Action:  Comments: Pt reports hiking went well last week with utilization of hiking boots and walking sticks. Pt reports trails were uneven with several obstacles but did not have any issues. Pt states she continues to have difficulty with looking up while ambulating and heel striking with ambulation. Pt reports dynamic warm up before walking helped decrease stiffness at the beginning of walk.      Objective:  Modalities:   Precautions:  Exercises:  Exercise Reps/ Time Weight/ Level Comments    Nustep  7' L6  x   Bridge 2x10     -   Clam 2x10   bilat  -   Abduction 2x10   bilat  -   Gastroc stretch 2x30\"     -   Soleus stretch 2x30\"     -   Stair calf raise 2x15     x   3 way reach 2x5   bilat  x   Standing HS stretch 2x30\"     -   Foam marches 2x15  On foam x   Hip abd 2x10  On foam  x   Hip ext 2x10  On foam x   Squats 2x10  On foam x          Gait training heel contact  2 x 20 ft  Mirror feedback -   Gait training toe off 2 x 20 ft  Mirror feedback -   Monster walk 2x20 steps  Lime   x   Banded lateral walks 2 x 20 steps Lime  x   STS 2x10  No UE support    SLS 2x30\"   x   Lunge onto bosu 2 x 15  bilat x   Lunges x10 ea  In // bars x          Dynamic warm up       Knee hug 2x20'   x   HS scoop 2x20'   x   March 2x20'   x   Side lunge 2x20'   x          Other: Standing posterior talus mob grade III with strap and right foot on step; long sitting distraction and A/P Grade III mobes      HEP:  Bridge 2x15  Clam 2x15  Abduction 2x10  Gastroc stretch 2x20\"  Soleus stretch 2x20\"  Heel raise 2x15  3 way reach 2x5    Access Code: [email or web address]  URL: [email or web address]  Date: 06/06/2022  Prepared by: Juliet Hendrickson    Exercises  Single Knee to Chest - 1 x daily - 7 x weekly - 1 sets - 10 reps  Walking hamstring sweep - 1 x daily - 7 x weekly - 1 sets - 10 reps  Lateral Lunge - 1 x daily - 7 x weekly - 1 sets - 10 reps  Walking March - 1 x daily - 7 x weekly - 1 sets - 10 reps      Treatment Charges: Mins Units   []  Modalities     [x]  Ther Exercise 40 2   []  Manual Therapy     []  Ther Activities     [x]  NMR 10 1   []  Vasocompression     []  Other     Total Treatment time 50 3       Assessment: [x] Progressing toward goals. Pt continues to have difficulty with SLS on R LE throughout activities. Addition of reps and band tension with reported fatigue at end of session. Continue to challenge SLS and proprioception. [] No change. [] Other:  [x] Patient would continue to benefit from skilled physical therapy services in order to:  The patient is a 73year old with a chief complaint of right ankle pain and signs and symptoms consistent with a diagnosis of right bimalleolar fracture. She presents with pain, hip and ankle weakness, decreased range of motion, decreased balance, a history of falls and functional limitations. She will benefit from skilled PT to address above deficits. STG/LTG:  STG: (to be met in 6 treatments)  1. ? Pain: 1-2/10   Met  2. ? ROM: 30 degree standing DF   Progressing toward goal  3. Patient to be independent with home exercise program as demonstrated by performance with correct form without cues. Progressing toward goal  4. Demonstrate Knowledge of fall prevention. Progressing toward goal     LTG: (to be met in 16 treatments)  1. Patient will demonstrate S/L hold > 15 seconds on the right. Progressing toward goal  2. Patient will be able to walk her dogs > 30 minutes. Progressing toward goal  3. Patient will resume hiking on trials without any falls. Progressing toward goal  4. Patient will improve LEFS by > 10%   Progressing toward goal       Pt. Education:  [x] Yes  [] No  [x] Reviewed Prior HEP/Ed  Method of Education: [x] Verbal  [x] Demo  [x] Written  Comprehension of Education:  [x] Verbalizes understanding. [] Demonstrates understanding. [] Needs review. [] Demonstrates/verbalizes HEP/Ed previously given. Plan: [x] Continue current frequency toward long and short term goals. [x] Specific Instructions for subsequent treatments:Progress hip and ankle strength, ankle ROM, functional strength and balance. Time In: 10:30            Time Out: 11:20    Electronically signed by:  DAYNE Gray        Evaluation/treatment performed by Student PT under the supervision of co-signing PT who agrees with all evaluation/treatment and documentation.

## 2022-06-15 ENCOUNTER — APPOINTMENT (OUTPATIENT)
Dept: PHYSICAL THERAPY | Facility: CLINIC | Age: 72
End: 2022-06-15
Payer: MEDICARE

## 2022-06-17 ENCOUNTER — HOSPITAL ENCOUNTER (OUTPATIENT)
Dept: PHYSICAL THERAPY | Facility: CLINIC | Age: 72
Setting detail: THERAPIES SERIES
Discharge: HOME | End: 2022-06-17
Payer: MEDICARE

## 2022-06-17 PROCEDURE — 97112 NEUROMUSCULAR REEDUCATION: CPT

## 2022-06-17 PROCEDURE — 97110 THERAPEUTIC EXERCISES: CPT

## 2022-06-17 NOTE — DISCHARGE SUMMARY
[] [facility]  [facility]  [address]  [phone]  F: [phone] [] [address]  [facility] [address]   [address]  P: [phone]  F: [phone] [] [address] &  Therapy  [address]  P: [phone]  F: [phone] [x] [address]  P: [phone]  F: [phone] [] [address]  [address] [facility] [address]   [address]   [place]: [phone]  F: [phone]      Physical Therapy Discharge Note    Date: 2022      Patient: Jessica Saldana  : 1950  MRN: [id number]    Physician: Eli Rocha PA-C                     Insurance: Medicare/ [facility] Medicare Supplement (20% co-ins)  Medical Diagnosis: Closed displaced bimalleolar fracture of right lower leg                        Rehab Codes: Right ankle pain (M25.571)   Visit# / total visits: 10/16; Progress note for Medicare patient due at visit 10                                  Cancels/No Shows: 0  Date of initial visit: 22                  Date of final visit: 22      Subjective:  Pain:  []? Yes  [x]? No   Location: R ankle        Pain Rating: (0-10 scale) 0/10  Pain altered Tx:  [x]? No  []? Yes  Action:  Comments: Pt reports feeling better overall and believes she feels more confident in her balance. Pt reports she is able to do all the activities she wants to do including hiking and walking her dogs without difficulty. Pt states she feels she can continue to work on HEP on her own.     Objective:  Test Measurements:   Functional Test: LEFI Score: 60/80, 25% functionally impaired     Function:   ROM  ° A/P STRENGTH     Left Right Left Right   Hip Flex     5 5   Ext     4+ 4   ER           IR           ABD     4+ 4   ADD           Knee Flex     5 5   Ext     5 5   Passive Bent knee DF 35 25 5 4+    PF     5 5   INV     5 5   EVER     5 5     BALANCE/PROPRIOCEPTION              []? Not tested   Single leg stance       R            L                                PAIN   Eyes open                 10 Sec. 15 Sec                        . []? Eyes closed                   Sec. Sec                       . []? Assessment:  Pt has met 6/8 goals and nearly met 2/8 goals. Pt reports she feels confident overall and believes she can continue to work on HEP at home on her own. Pt shows improvement in strength, balance, and function. Pt reports she is able to complete activities she needs to do at home and for work. Issued updated HEP to patient and told to call if she has any issues or questions. Pt will be discharged at this date.        STG/LTG:  STG: (to be met in 6 treatments)  1. ? Pain: 1-2/10        Met  2. ? ROM: 30 degree standing DF        Adequate for D/C  3. Patient to be independent with home exercise program as demonstrated by performance with correct form without cues. Met  4. Demonstrate Knowledge of fall prevention. Met     LTG: (to be met in 16 treatments)  1. Patient will demonstrate S/L hold > 15 seconds on the right. Adequate for D/C  2. Patient will be able to walk her dogs > 30 minutes. Met  3. Patient will resume hiking on trials without any falls. Met  4.  Patient will improve LEFS by > 10%              Met    Treatment to Date:  [x] Therapeutic Exercise    [] Modalities:  [] Therapeutic Activity    [] Ultrasound  [] Electrical Stimulation  [x] Gait Training     [] Massage       [] Lumbar/Cervical Traction  [x] Neuromuscular Re-education [] Cold/hotpack [] Iontophoresis: 4 mg/mL  [x] Instruction in Home Exercise Program                     Dexamethasone Sodium  [x] Manual Therapy             Phosphate 40-80 mAmin  [] Aquatic Therapy                   [] Vasocompression/    [] Other:             Game Ready    Discharge Status:     [x] Pt recovered from conditions. Treatment goals were met. [] Pt received maximum benefit. No further therapy indicated at this time. [x] Pt to continue exercise/home instructions independently. [] Therapy interrupted due to:    [] Pt has 2 or more no shows/cancels, is discontinued per our policy. [] Pt has completed prescribed number of treatment sessions. [] Other:         Electronically signed by DAYNE Sears on 6/17/2022 at 11:49 AM     Evaluation/treatment performed by Student PT under the supervision of co-signing PT who agrees with all evaluation/treatment and documentation. If you have any questions or concerns, please don't hesitate to call.   Thank you for your referral.

## 2022-06-17 NOTE — PRE-CERTIFICATION NOTE
Medicare Cap   [x] Physical Therapy  [] Speech Therapy  [] Occupational therapy  *PT and Speech caps combine      $2150 Limit for PT and Speech combined  $2150 Limit for OT individually  At the beginning of the month where you expect to go over $2150, please add the [address] [place] [address] modifier      Patient Name: Radha Matter: 1950    Note:  This is an estimate of charges billed.      Date of [address] Name # units/ charge $$$ charge Daily Total Charge Ongoing Total $$$   5/04/2022 Eval,TE 1,2 98.01+29.21+22.84 150.06 150.86   5/11/2022 TE,NMR 2,2 33.82+26.02+22.84x2 105.52 256.38   5/13/22 TE, NMR 2,1 29.21+22.84+26.02 78.07 334.45   5/16/22 TE, NMR 2,1 29.21+22.84+26.02 78.07 412.52   5/25/22 TE, NMR 3, 1 33.82+22.84+22.84+22.84 102.34 514.86   5/27/22 TE, NMR 2+1 29.21+22.84+26.02 78.07 592.93   6/1/22 TE, NMR 2+1 29.21+22.84+26.02 78.07 671.00   6/3 TE, NMR 2+1  78.07 749.07   6/6/22 TE, NMR 2+1 29.21+22.84+26.02 78.07 827.14   6/14/22 TE, NMR 2+1 29.21+22.84+26.02 78.07 905.21   6/17/22 TE, NMR 2+1 29.21+22.84+26.02 78.07 983.28

## 2022-06-17 NOTE — FLOWSHEET NOTE
[] [address]  [address]  [phone]  F: [phone] [] [address]  [employer] [address]   [address]  P: [phone]  F: [phone] [] [place]  [facility]  [address] [facility] [address]  P: [phone]  F: [phone] [x] [address]  P: [phone]  F: [phone] [] [address]  [facility]   [address]   [place]: [phone]  F: [phone]      Physical Therapy Daily Treatment Note    Date:  2022  Patient Name:  Jan Bailey    :  1950  MRN: [id number]  Physician: Karla Pablo PA-C                     Insurance: Medicare/ [facility] AARP Medicare Supplement (20% co-ins)  Medical Diagnosis: Closed displaced bimalleolar fracture of right lower leg                        Rehab Codes: Right ankle pain (M25.571)   Visit# / total visits: 10/16; Progress note for Medicare patient due at visit 10     Cancels/No Shows: 0    Subjective:    Pain:  [] Yes  [x] No Location: R ankle Pain Rating: (0-10 scale) 0/10  Pain altered Tx:  [x] No  [] Yes  Action:  Comments: Pt reports feeling better overall and believes she feels more confident in her balance. Pt reports she is able to do all the activities she wants to do including hiking and walking her dogs without difficulty. Pt states she feels she can continue to work on HEP on her own.     Objective:  Modalities:   Precautions:  Exercises:  Exercise Reps/ Time Weight/ Level Comments    Nustep  7' L6  x   Bridge 2x10     -   Clam 2x10   bilat  -   Abduction 2x10   bilat  -   Gastroc stretch 2x30\"     -   Soleus stretch 2x30\"     -   Stair calf raise 2x15     x   3 way reach 2x5   bilat  x   Standing HS stretch 2x30\"     -   Foam marches 2x15  On foam x   Hip abd 2x10 On foam  x   Hip ext 2x10  On foam x   Squats 2x10  On foam x          Gait training heel contact  2 x 20 ft  Mirror feedback -   Gait training toe off 2 x 20 ft  Mirror feedback -   Monster walk 2x20 steps  Lime   x   Banded lateral walks 2 x 20 steps Lime  x   STS 2x10  No UE support    SLS 2x30\"   x   Lunge onto bosu 2 x 15  bilat x   Lunges x10 ea  In // bars x          Dynamic warm up       Knee hug 2x20'   x   HS scoop 2x20'   x   March 2x20'   x   Side lunge 2x20'   x          Other: Standing posterior talus mob grade III with strap and right foot on step; long sitting distraction and A/P Grade III mobes      HEP:  Bridge 2x15  Clam 2x15  Abduction 2x10  Gastroc stretch 2x20\"  Soleus stretch 2x20\"  Heel raise 2x15  3 way reach 2x5    Access Code: [email or web address]  URL: [employer]/  Date: 06/06/2022  Prepared by: Myranda Jimenez    Exercises  Single Knee to Chest - 1 x daily - 7 x weekly - 1 sets - 10 reps  Walking hamstring sweep - 1 x daily - 7 x weekly - 1 sets - 10 reps  Lateral Lunge - 1 x daily - 7 x weekly - 1 sets - 10 reps  Walking March - 1 x daily - 7 x weekly - 1 sets - 10 reps    Access Code: [email or web address]  URL: [employer]/  Date: 06/17/2022  Prepared by: Myranda Barbara    Exercises  Squat - 1 x daily - 7 x weekly - 2 sets - 10 reps  Side Stepping with Resistance at Feet - 1 x daily - 7 x weekly - 2 sets - 10 reps  Forward Monster Walks - 1 x daily - 7 x weekly - 2 sets - 10 reps  Standing Hip Abduction - 1 x daily - 7 x weekly - 2 sets - 15 reps  Standing Hip Extension AROM - 1 x daily - 7 x weekly - 2 sets - 15 reps      Treatment Charges: Mins Units   []  Modalities     [x]  Ther Exercise 30 2   []  Manual Therapy     []  Ther Activities     [x]  NMR 10 1   []  Vasocompression     []  Other     Total Treatment time 40 3       Assessment: [x] Progressing toward goals. Pt has met 6/8 goals and nearly met 2/8 goals.   Pt reports she feels confident overall and believes she can continue to work on HEP at home on her own. Pt shows improvement in strength, balance, and function. Pt reports she is able to complete activities she needs to do at home and for work. Issued updated HEP to patient and told to call if she has any issues or questions. Pt will be discharged at this date. [] No change. [] Other:  [] Patient would continue to benefit from skilled physical therapy services in order to: The patient is a 73year old with a chief complaint of right ankle pain and signs and symptoms consistent with a diagnosis of right bimalleolar fracture. She presents with pain, hip and ankle weakness, decreased range of motion, decreased balance, a history of falls and functional limitations. She will benefit from skilled PT to address above deficits. STG/LTG:  STG: (to be met in 6 treatments)  ? Pain: 1-2/10   Met  ? ROM: 30 degree standing DF   Adequate for D/C  Patient to be independent with home exercise program as demonstrated by performance with correct form without cues. Met  Demonstrate Knowledge of fall prevention. Met     LTG: (to be met in 16 treatments)  Patient will demonstrate S/L hold > 15 seconds on the right. Adequate for D/C  Patient will be able to walk her dogs > 30 minutes. Met  Patient will resume hiking on trials without any falls. Met  Patient will improve LEFS by > 10%   Met       Pt. Education:  [x] Yes  [] No  [x] Reviewed Prior HEP/Ed  Method of Education: [x] Verbal  [x] Demo  [x] Written  Comprehension of Education:  [x] Verbalizes understanding. [] Demonstrates understanding. [] Needs review. [x] Demonstrates/verbalizes HEP/Ed previously given. Plan: [x] Discharge   [] Specific Instructions for subsequent treatments:Progress hip and ankle strength, ankle ROM, functional strength and balance.        Time In: 11:00            Time Out: 11:40    Electronically signed by:  DAYNE Cheung        Evaluation/treatment performed by Student PT under the supervision of co-signing PT who agrees with all evaluation/treatment and documentation.

## 2022-06-20 ENCOUNTER — HOSPITAL ENCOUNTER (OUTPATIENT)
Dept: PHYSICAL THERAPY | Facility: CLINIC | Age: 72
Setting detail: THERAPIES SERIES
End: 2022-06-20
Payer: MEDICARE

## 2022-06-23 ENCOUNTER — APPOINTMENT (OUTPATIENT)
Dept: PHYSICAL THERAPY | Facility: CLINIC | Age: 72
End: 2022-06-23
Payer: MEDICARE

## 2023-08-07 ENCOUNTER — HOSPITAL ENCOUNTER (OUTPATIENT)
Age: 73
Discharge: HOME | End: 2023-08-09
Payer: MEDICARE

## 2023-08-07 ENCOUNTER — HOSPITAL ENCOUNTER (OUTPATIENT)
Dept: GENERAL RADIOLOGY | Age: 73
Discharge: HOME | End: 2023-08-09
Payer: MEDICARE

## 2023-08-07 DIAGNOSIS — M79.641 HAND PAIN, RIGHT: ICD-10-CM

## 2023-08-07 PROCEDURE — 73130 X-RAY EXAM OF HAND: CPT

## 2023-08-07 PROCEDURE — 73110 X-RAY EXAM OF WRIST: CPT

## 2023-08-28 ENCOUNTER — OFFICE VISIT (OUTPATIENT)
Age: 73
End: 2023-08-28
Payer: MEDICARE

## 2023-08-28 VITALS — HEIGHT: 68 IN | WEIGHT: 180 LBS | BODY MASS INDEX: 27.28 KG/M2

## 2023-08-28 DIAGNOSIS — S62.356A CLOSED NONDISPLACED FRACTURE OF SHAFT OF FIFTH METACARPAL BONE OF RIGHT HAND, INITIAL ENCOUNTER: ICD-10-CM

## 2023-08-28 DIAGNOSIS — M79.641 RIGHT HAND PAIN: Primary | ICD-10-CM

## 2023-08-28 PROCEDURE — G8400 PT W/DXA NO RESULTS DOC: HCPCS | Performed by: ORTHOPAEDIC SURGERY

## 2023-08-28 PROCEDURE — 1090F PRES/ABSN URINE INCON ASSESS: CPT | Performed by: ORTHOPAEDIC SURGERY

## 2023-08-28 PROCEDURE — 3017F COLORECTAL CA SCREEN DOC REV: CPT | Performed by: ORTHOPAEDIC SURGERY

## 2023-08-28 PROCEDURE — 1123F ACP DISCUSS/DSCN MKR DOCD: CPT | Performed by: ORTHOPAEDIC SURGERY

## 2023-08-28 PROCEDURE — G8419 CALC BMI OUT NRM PARAM NOF/U: HCPCS | Performed by: ORTHOPAEDIC SURGERY

## 2023-08-28 PROCEDURE — 1036F TOBACCO NON-USER: CPT | Performed by: ORTHOPAEDIC SURGERY

## 2023-08-28 PROCEDURE — G8427 DOCREV CUR MEDS BY ELIG CLIN: HCPCS | Performed by: ORTHOPAEDIC SURGERY

## 2023-08-28 PROCEDURE — 99203 OFFICE O/P NEW LOW 30 MIN: CPT | Performed by: ORTHOPAEDIC SURGERY

## 2023-08-28 NOTE — PROGRESS NOTES
[address] [place] [address]  [address] [facility] [address]  [phone]     Chief Compliant:  Chief Complaint   Patient presents with    Hand Pain     Right hand fracture. History of Present Illness: This is a pleasant 72 y.o. female who presents to the clinic today for evaluation / follow up of right hand pain. She had a fall approximately 3 weeks ago. She landed on the ulnar aspect of her right hand. She had some pain and had an x-ray taken. This showed a fracture. She ordered an ulnar gutter splint on line and has been wearing that for the past week or so. Pain is minimal.    Physical Exam: The right hand has some ulnar swelling. There is no deformity. She has minimal tenderness to the fifth metacarpal.  She has full range of motion of the digits. Imaging: X-rays taken on August 7 were reviewed and show a minimally displaced fracture of the base of the fifth metacarpal.  New x-rays taken today 3 views show no significant change in alignment. There has been some interval healing. Assessment and Plan: This is a pleasant 72 y.o. female who has a right fifth metacarpal base fracture. This is essentially nondisplaced. Follow-up x-rays show no change. This can be treated nonoperatively. She can continue to wear her ulnar gutter brace for the next 3 weeks or so. As the pain improves she can wean out of the brace and follow-up as needed. Past History:  No current outpatient medications on file.   Allergies   Allergen Reactions    Penicillins      Social History     Socioeconomic History    Marital status:      Spouse name: Not on file    Number of children: Not on file    Years of education: Not on file    Highest education level: Not on file   Occupational History    Not on file   Tobacco Use    Smoking status: Never     Passive exposure: Never    Smokeless tobacco: Never   Vaping Use    Vaping Use:

## 2023-11-29 LAB
A/G RATIO, EXTERNAL: NORMAL
ALBUMIN, EXTERNAL: NORMAL
ALK PHOS, EXTERNAL: NORMAL
ANION GAP, EXTERNAL: NORMAL
BILI DIRECT, EXTERNAL: NORMAL
BILI TOTAL, EXTERNAL: NORMAL
BUN, EXTERNAL: NORMAL
BUN/CREATININE RATIO, EXTERNAL: NORMAL
CALCIUM, EXTERNAL: NORMAL
CALCIUM, ION, EXTERNAL: NORMAL
CHLORIDE, EXTERNAL: NORMAL
CHOLESTEROL, TOTAL: 182 MG/DL
CHOLESTEROL/HDL RATIO: 2.7
CO2, EXTERNAL: NORMAL
CREATININE, EXTERNAL: NORMAL
EGFR IF AFA, EXTERNAL: NORMAL
EGFR IF NONAFRICAN AMERICAN: NORMAL
GLOBULIN, EXTERNAL: NORMAL
GLUCOSE SER, EXTERNAL: NORMAL
HCT VFR BLD CALC: NORMAL %
HDLC SERPL-MCNC: 68 MG/DL (ref 35–70)
HEMOGLOBIN: NORMAL
LDL CHOLESTEROL CALCULATED: 85 MG/DL (ref 0–160)
NONHDLC SERPL-MCNC: NORMAL MG/DL
PLATELET # BLD: NORMAL 10*3/UL
POTASSIUM, EXTERNAL: NORMAL
PROTEIN TOT, EXTERNAL: NORMAL
SGOT (AST), EXTERNAL: NORMAL
SGPT (ALT), EXTERNAL: NORMAL
SODIUM, EXTERNAL: NORMAL
TRIGL SERPL-MCNC: 144 MG/DL
VITAMIN D 25-HYDROXY: 31.3
VITAMIN D2, 25 HYDROXY: NORMAL
VITAMIN D3,25 HYDROXY: NORMAL
VLDLC SERPL CALC-MCNC: 29 MG/DL
WBC # BLD: NORMAL 10*3/UL

## 2023-11-30 RX ORDER — TRANDOLAPRIL TABLETS 2 MG/1
TABLET ORAL
COMMUNITY
Start: 2019-02-01

## 2023-11-30 RX ORDER — MELOXICAM 15 MG/1
15 TABLET ORAL PRN
COMMUNITY
Start: 2022-01-31

## 2023-11-30 RX ORDER — EPINEPHRINE 0.3 MG/.3ML
INJECTION SUBCUTANEOUS
COMMUNITY
Start: 2022-06-19

## 2023-11-30 RX ORDER — FENOFIBRATE 48 MG/1
TABLET, COATED ORAL
COMMUNITY
Start: 2019-02-01

## 2023-12-01 ENCOUNTER — OFFICE VISIT (OUTPATIENT)
Age: 73
End: 2023-12-01
Payer: MEDICARE

## 2023-12-01 VITALS
DIASTOLIC BLOOD PRESSURE: 80 MMHG | SYSTOLIC BLOOD PRESSURE: 138 MMHG | TEMPERATURE: 97.4 F | OXYGEN SATURATION: 99 % | HEART RATE: 74 BPM | HEIGHT: 68 IN | WEIGHT: 183 LBS | BODY MASS INDEX: 27.74 KG/M2

## 2023-12-01 DIAGNOSIS — M25.512 ACUTE PAIN OF LEFT SHOULDER: ICD-10-CM

## 2023-12-01 DIAGNOSIS — G43.109 MIGRAINE WITH AURA AND WITHOUT STATUS MIGRAINOSUS, NOT INTRACTABLE: ICD-10-CM

## 2023-12-01 DIAGNOSIS — Z00.00 MEDICARE ANNUAL WELLNESS VISIT, SUBSEQUENT: Primary | ICD-10-CM

## 2023-12-01 DIAGNOSIS — F43.21 SITUATIONAL DEPRESSION: ICD-10-CM

## 2023-12-01 DIAGNOSIS — M15.9 PRIMARY OSTEOARTHRITIS INVOLVING MULTIPLE JOINTS: ICD-10-CM

## 2023-12-01 DIAGNOSIS — Z78.0 ASYMPTOMATIC MENOPAUSAL STATE: ICD-10-CM

## 2023-12-01 DIAGNOSIS — R73.9 HYPERGLYCEMIA: ICD-10-CM

## 2023-12-01 DIAGNOSIS — Z12.31 ENCOUNTER FOR SCREENING MAMMOGRAM FOR BREAST CANCER: ICD-10-CM

## 2023-12-01 DIAGNOSIS — I10 ESSENTIAL HYPERTENSION: ICD-10-CM

## 2023-12-01 DIAGNOSIS — E78.1 HYPERTRIGLYCERIDEMIA: ICD-10-CM

## 2023-12-01 DIAGNOSIS — E55.9 VITAMIN D DEFICIENCY: ICD-10-CM

## 2023-12-01 DIAGNOSIS — M85.88 OSTEOPENIA OF LUMBAR SPINE: ICD-10-CM

## 2023-12-01 PROCEDURE — 1123F ACP DISCUSS/DSCN MKR DOCD: CPT | Performed by: PHYSICIAN ASSISTANT

## 2023-12-01 PROCEDURE — 3017F COLORECTAL CA SCREEN DOC REV: CPT | Performed by: PHYSICIAN ASSISTANT

## 2023-12-01 PROCEDURE — 3079F DIAST BP 80-89 MM HG: CPT | Performed by: PHYSICIAN ASSISTANT

## 2023-12-01 PROCEDURE — G8484 FLU IMMUNIZE NO ADMIN: HCPCS | Performed by: PHYSICIAN ASSISTANT

## 2023-12-01 PROCEDURE — G0439 PPPS, SUBSEQ VISIT: HCPCS | Performed by: PHYSICIAN ASSISTANT

## 2023-12-01 PROCEDURE — 3075F SYST BP GE 130 - 139MM HG: CPT | Performed by: PHYSICIAN ASSISTANT

## 2023-12-01 RX ORDER — SUMATRIPTAN 50 MG/1
50 TABLET, FILM COATED ORAL
COMMUNITY

## 2023-12-01 SDOH — ECONOMIC STABILITY: FOOD INSECURITY: WITHIN THE PAST 12 MONTHS, YOU WORRIED THAT YOUR FOOD WOULD RUN OUT BEFORE YOU GOT MONEY TO BUY MORE.: NEVER TRUE

## 2023-12-01 SDOH — ECONOMIC STABILITY: HOUSING INSECURITY
IN THE LAST 12 MONTHS, WAS THERE A TIME WHEN YOU DID NOT HAVE A STEADY PLACE TO SLEEP OR SLEPT IN A SHELTER (INCLUDING NOW)?: NO

## 2023-12-01 SDOH — ECONOMIC STABILITY: INCOME INSECURITY: HOW HARD IS IT FOR YOU TO PAY FOR THE VERY BASICS LIKE FOOD, HOUSING, MEDICAL CARE, AND HEATING?: NOT VERY HARD

## 2023-12-01 SDOH — ECONOMIC STABILITY: FOOD INSECURITY: WITHIN THE PAST 12 MONTHS, THE FOOD YOU BOUGHT JUST DIDN'T LAST AND YOU DIDN'T HAVE MONEY TO GET MORE.: NEVER TRUE

## 2023-12-01 ASSESSMENT — ENCOUNTER SYMPTOMS
ABDOMINAL PAIN: 0
BACK PAIN: 0
EYE REDNESS: 0
SORE THROAT: 0
RHINORRHEA: 0
COUGH: 0
SINUS PRESSURE: 0
VOMITING: 0
SHORTNESS OF BREATH: 0
CONSTIPATION: 0
DIARRHEA: 0
BLOOD IN STOOL: 0
SINUS PAIN: 0
EYE PAIN: 0
NAUSEA: 0
WHEEZING: 0

## 2023-12-01 ASSESSMENT — PATIENT HEALTH QUESTIONNAIRE - PHQ9
1. LITTLE INTEREST OR PLEASURE IN DOING THINGS: 1
SUM OF ALL RESPONSES TO PHQ9 QUESTIONS 1 & 2: 2
SUM OF ALL RESPONSES TO PHQ QUESTIONS 1-9: 2
2. FEELING DOWN, DEPRESSED OR HOPELESS: 1
SUM OF ALL RESPONSES TO PHQ QUESTIONS 1-9: 2

## 2023-12-01 ASSESSMENT — LIFESTYLE VARIABLES
HOW OFTEN DO YOU HAVE A DRINK CONTAINING ALCOHOL: 2-3 TIMES A WEEK
HOW MANY STANDARD DRINKS CONTAINING ALCOHOL DO YOU HAVE ON A TYPICAL DAY: 1 OR 2

## 2023-12-14 ENCOUNTER — HOSPITAL ENCOUNTER (OUTPATIENT)
Dept: PHYSICAL THERAPY | Facility: CLINIC | Age: 73
Setting detail: THERAPIES SERIES
Discharge: HOME | End: 2023-12-14
Payer: MEDICARE

## 2023-12-14 PROCEDURE — 97110 THERAPEUTIC EXERCISES: CPT

## 2023-12-14 PROCEDURE — 97162 PT EVAL MOD COMPLEX 30 MIN: CPT

## 2024-01-02 ENCOUNTER — HOSPITAL ENCOUNTER (OUTPATIENT)
Dept: PHYSICAL THERAPY | Facility: CLINIC | Age: 74
Setting detail: THERAPIES SERIES
Discharge: HOME | End: 2024-01-02
Payer: MEDICARE

## 2024-01-02 PROCEDURE — 97110 THERAPEUTIC EXERCISES: CPT

## 2024-01-02 NOTE — PRE-CERTIFICATION NOTE
Medicare Cap   [x] Physical Therapy  [] Speech Therapy  [] Occupational therapy  *PT and Speech caps combine      $2230 Limit for PT and Speech combined  $2230 Limit for OT individually  At the beginning of the month where you expect to go over $2230, please add the KX modifier      Patient Name: Sandra Anna  YOB: 1950    Note:  This is an estimate of charges billed.     Date of TX Charge Name # units/ charge $$$ charge Daily Total Charge Ongoing Total $$$   12/14/23 REY Sykes,Vaso 1+1+1 95.95+21.75+8.79 126.49 126.49   01/02/23 TE 2 28.13+21.75 49.88

## 2024-01-02 NOTE — FLOWSHEET NOTE
[] [facility]  Outpatient Rehabilitation &  Therapy  [address]  [phone]  F: [phone] [] [facility]  Outpatient Rehabilitation &  Therapy  [address]  P: [phone]  F: [phone] [] [facility]  Outpatient Rehabilitation &  Therapy  [address]  P: [phone]  F: [phone] [x] [facility]  Outpatient Rehabilitation &  Therapy  [address]  P: [phone]  F: [phone] [] [facility]  Outpatient Rehabilitation &  Therapy  [address]   P: [phone]  F: [phone]  [] [facility]  Outpatient Rehabilitation &  Therapy  [address]   P: [phone]  F: [phone] [] [facility]  Outpatient Rehabilitation &  Therapy  [address]  P: [phone]  F: [phone] [] [facility]  Outpatient Rehabilitation &  Therapy  [address]  P: [phone]  F: [phone] [] [facility]  Outpatient Rehabilitation &  Therapy  [address]  P: [phone]  F: [phone]      Physical Therapy Daily Treatment Note    Date:  2024  Patient Name:  Sandra Anna    :  1950  MRN: [id number]  Physician: Alexandria Laguna PA-C                             Insurance: 1.Medicare; Alok yr; vs based on med nec; no auth req; 20% coins; ded met; no OOP limit  2.AARP Medicare Supp; Alok yr; follows primary; no pt resp due   Medical Diagnosis: M25.512 (ICD-10-CM) - Acute pain of left shoulder                  Rehab Codes: M25.512 (ICD-10-CM) - Acute pain of left shoulder   Onset Date: 2018                      Next 's appt: Pending     Visit# / total visits: ; Progress note for Medicare patient due at visit 10     Cancels/No Shows: 0    Subjective:    Pain:  [] Yes  [] No Location: left shoulder Pain Rating:

## 2024-01-09 ENCOUNTER — HOSPITAL ENCOUNTER (OUTPATIENT)
Dept: PHYSICAL THERAPY | Facility: CLINIC | Age: 74
Setting detail: THERAPIES SERIES
Discharge: HOME | End: 2024-01-09
Payer: MEDICARE

## 2024-01-09 PROCEDURE — 97016 VASOPNEUMATIC DEVICE THERAPY: CPT

## 2024-01-09 PROCEDURE — 97110 THERAPEUTIC EXERCISES: CPT

## 2024-01-09 NOTE — PRE-CERTIFICATION NOTE
Medicare Cap   [x] Physical Therapy  [] Speech Therapy  [] Occupational therapy  *PT and Speech caps combine      $2230 Limit for PT and Speech combined  $2230 Limit for OT individually  At the beginning of the month where you expect to go over $2230, please add the KX modifier      Patient Name: Sandra Anna  YOB: 1950    Note:  This is an estimate of charges billed.     Date of TX Charge Name # units/ charge $$$ charge Daily Total Charge Ongoing Total $$$   12/14/23 MonyTE,Vaso 1+1+1 95.95+21.75+8.79 126.49 126.49   New Year        01/02/23 TE 2 28.13+21.75 49.88    01/09/23 TE,Vaso 2+1 28.13+21.75+8.79 58.67 108.55

## 2024-01-09 NOTE — FLOWSHEET NOTE
[] [facility]  Outpatient Rehabilitation &  Therapy  [address]  [phone]  F: [phone] [] [facility]  Outpatient Rehabilitation &  Therapy  [address]  P: [phone]  F: [phone] [] [facility]  Outpatient Rehabilitation &  Therapy  [address]  P: [phone]  F: [phone] [x] [facility]  Outpatient Rehabilitation &  Therapy  [address]  P: [phone]  F: [phone] [] [facility]  Outpatient Rehabilitation &  Therapy  [address]   P: [phone]  F: [phone]  [] [facility]  Outpatient Rehabilitation &  Therapy  [address]   P: [phone]  F: [phone] [] [facility]  Outpatient Rehabilitation &  Therapy  [address]  P: [phone]  F: [phone] [] [facility]  Outpatient Rehabilitation &  Therapy  [address]  P: [phone]  F: [phone] [] [facility]  Outpatient Rehabilitation &  Therapy  [address]  P: [phone]  F: [phone]      Physical Therapy Daily Treatment Note    Date:  2024  Patient Name:  Sandra Anna    :  1950  MRN: [id number]  Physician: Alexandria Laguna PA-C                             Insurance: 1.Medicare; Alok yr; vs based on med nec; no auth req; 20% coins; ded met; no OOP limit  2.AARP Medicare Supp; Alok yr; follows primary; no pt resp due   Medical Diagnosis: M25.512 (ICD-10-CM) - Acute pain of left shoulder                  Rehab Codes: M25.512 (ICD-10-CM) - Acute pain of left shoulder   Onset Date: 2018                      Next 's appt: Pending     Visit# / total visits: 3/16; Progress note for Medicare patient due at visit 10     Cancels/No Shows: 0    Subjective:    Pain:  [] Yes  [] No Location: left shoulder Pain Rating:

## 2024-01-16 ENCOUNTER — HOSPITAL ENCOUNTER (OUTPATIENT)
Dept: PHYSICAL THERAPY | Facility: CLINIC | Age: 74
Setting detail: THERAPIES SERIES
Discharge: HOME | End: 2024-01-16
Payer: MEDICARE

## 2024-01-16 PROCEDURE — 97016 VASOPNEUMATIC DEVICE THERAPY: CPT

## 2024-01-16 PROCEDURE — 97110 THERAPEUTIC EXERCISES: CPT

## 2024-01-16 NOTE — FLOWSHEET NOTE
[] [facility]  Outpatient Rehabilitation &  Therapy  [address]  [phone]  F: [phone] [] [facility]  Outpatient Rehabilitation &  Therapy  [address]  P: [phone]  F: [phone] [] [facility]  Outpatient Rehabilitation &  Therapy  [address]  P: [phone]  F: [phone] [x] [facility]  Outpatient Rehabilitation &  Therapy  [address]  P: [phone]  F: [phone] [] [facility]  Outpatient Rehabilitation &  Therapy  [address]   P: [phone]  F: [phone]  [] [facility]  Outpatient Rehabilitation &  Therapy  [address]   P: [phone]  F: [phone] [] [facility]  Outpatient Rehabilitation &  Therapy  [address]  P: [phone]  F: [phone] [] [facility]  Outpatient Rehabilitation &  Therapy  [address]  P: [phone]  F: [phone] [] [facility]  Outpatient Rehabilitation &  Therapy  [address]  P: [phone]  F: [phone]      Physical Therapy Daily Treatment Note    Date:  2024  Patient Name:  Sandra Anna    :  1950  MRN: [id number]  Physician: Alexandria Laguna PA-C                             Insurance: 1.Medicare; Alok yr; vs based on med nec; no auth req; 20% coins; ded met; no OOP limit  2.AARP Medicare Supp; Alok yr; follows primary; no pt resp due   Medical Diagnosis: M25.512 (ICD-10-CM) - Acute pain of left shoulder                  Rehab Codes: M25.512 (ICD-10-CM) - Acute pain of left shoulder   Onset Date: 2018                      Next 's appt: Pending     Visit# / total visits: ; Progress note for Medicare patient due at visit 10     Cancels/No Shows: 0    Subjective:    Pain:  [] Yes  [x] No Location: left shoulder Pain Rating:

## 2024-01-17 ENCOUNTER — HOSPITAL ENCOUNTER (OUTPATIENT)
Dept: MAMMOGRAPHY | Age: 74
Discharge: HOME | End: 2024-01-19
Attending: PHYSICIAN ASSISTANT
Payer: MEDICARE

## 2024-01-17 VITALS — HEIGHT: 68 IN | BODY MASS INDEX: 27.28 KG/M2 | WEIGHT: 180 LBS

## 2024-01-17 DIAGNOSIS — Z12.31 ENCOUNTER FOR SCREENING MAMMOGRAM FOR BREAST CANCER: ICD-10-CM

## 2024-01-17 DIAGNOSIS — Z78.0 ASYMPTOMATIC MENOPAUSAL STATE: ICD-10-CM

## 2024-01-17 DIAGNOSIS — M85.88 OSTEOPENIA OF LUMBAR SPINE: ICD-10-CM

## 2024-01-17 PROCEDURE — 77063 BREAST TOMOSYNTHESIS BI: CPT

## 2024-01-17 PROCEDURE — 77080 DXA BONE DENSITY AXIAL: CPT

## 2024-01-23 ENCOUNTER — HOSPITAL ENCOUNTER (OUTPATIENT)
Dept: PHYSICAL THERAPY | Facility: CLINIC | Age: 74
Setting detail: THERAPIES SERIES
Discharge: HOME | End: 2024-01-23
Payer: MEDICARE

## 2024-01-23 PROCEDURE — 97110 THERAPEUTIC EXERCISES: CPT

## 2024-01-23 NOTE — FLOWSHEET NOTE
[] [facility]  Outpatient Rehabilitation &  Therapy  [address]  [phone]  F: [phone] [] [facility]  Outpatient Rehabilitation &  Therapy  [address]  P: [phone]  F: [phone] [] [facility]  Outpatient Rehabilitation &  Therapy  [address]  P: [phone]  F: [phone] [x] [facility]  Outpatient Rehabilitation &  Therapy  [address]  P: [phone]  F: [phone] [] [facility]  Outpatient Rehabilitation &  Therapy  [address]   P: [phone]  F: [phone]  [] [facility]  Outpatient Rehabilitation &  Therapy  [address]   P: [phone]  F: [phone] [] [facility]  Outpatient Rehabilitation &  Therapy  [address]  P: [phone]  F: [phone] [] [facility]  Outpatient Rehabilitation &  Therapy  [address]  P: [phone]  F: [phone] [] [facility]  Outpatient Rehabilitation &  Therapy  [address]  P: [phone]  F: [phone]      Physical Therapy Daily Treatment Note    Date:  2024  Patient Name:  Sandra Anna    :  1950  MRN: [id number]  Physician: Alexandria Laguna PA-C                             Insurance: 1.Medicare; Alok yr; vs based on med nec; no auth req; 20% coins; ded met; no OOP limit  2.AARP Medicare Supp; Alok yr; follows primary; no pt resp due   Medical Diagnosis: M25.512 (ICD-10-CM) - Acute pain of left shoulder                  Rehab Codes: M25.512 (ICD-10-CM) - Acute pain of left shoulder   Onset Date: 2018                      Next 's appt: Pending     Visit# / total visits: ; Progress note for Medicare patient due at visit 10     Cancels/No Shows: 0    Subjective:    Pain:  [] Yes  [x] No Location: left shoulder Pain Rating:

## 2024-02-07 ENCOUNTER — HOSPITAL ENCOUNTER (OUTPATIENT)
Dept: PHYSICAL THERAPY | Facility: CLINIC | Age: 74
Setting detail: THERAPIES SERIES
Discharge: HOME | End: 2024-02-07
Payer: MEDICARE

## 2024-02-07 PROCEDURE — 97110 THERAPEUTIC EXERCISES: CPT

## 2024-02-07 PROCEDURE — 97016 VASOPNEUMATIC DEVICE THERAPY: CPT

## 2024-02-07 NOTE — FLOWSHEET NOTE
[] [facility]  Outpatient Rehabilitation &  Therapy  [address]  [phone]  F: [phone] [] [facility]  Outpatient Rehabilitation &  Therapy  [address]  P: [phone]  F: [phone] [] [facility]  Outpatient Rehabilitation &  Therapy  [address]  P: [phone]  F: [phone] [x] [facility]  Outpatient Rehabilitation &  Therapy  [address]  P: [phone]  F: [phone] [] [facility]  Outpatient Rehabilitation &  Therapy  [address]   P: [phone]  F: [phone]  [] [facility]  Outpatient Rehabilitation &  Therapy  [address]   P: [phone]  F: [phone] [] [facility]  Outpatient Rehabilitation &  Therapy  [address]  P: [phone]  F: [phone] [] [facility]  Outpatient Rehabilitation &  Therapy  [address]  [address]  P: [phone]  F: [phone] [] [facility]  Outpatient Rehabilitation &  Therapy  [address]  P: [phone]  F: [phone]      Physical Therapy Daily Treatment Note    Date:  2024  Patient Name:  Sandra Anna    :  1950  MRN: [id number]  Physician: Alexandria Laguna PA-C                             Insurance: 1.Medicare; Alok yr; vs based on med nec; no auth req; 20% coins; ded met; no OOP limit  2.AARP Medicare Supp; Alok yr; follows primary; no pt resp due   Medical Diagnosis: M25.512 (ICD-10-CM) - Acute pain of left shoulder                  Rehab Codes: M25.512 (ICD-10-CM) - Acute pain of left shoulder   Onset Date: 2018                      Next 's appt: Pending     Visit# / total visits: ; Progress note for Medicare patient due at visit 10     Cancels/No Shows: 0    Subjective:    Pain:  [] Yes  [x] No Location: left shoulder Pain Rating:

## 2024-02-14 ENCOUNTER — HOSPITAL ENCOUNTER (OUTPATIENT)
Dept: PHYSICAL THERAPY | Facility: CLINIC | Age: 74
Setting detail: THERAPIES SERIES
Discharge: HOME | End: 2024-02-14
Payer: MEDICARE

## 2024-02-14 PROCEDURE — 97016 VASOPNEUMATIC DEVICE THERAPY: CPT

## 2024-02-14 PROCEDURE — 97110 THERAPEUTIC EXERCISES: CPT

## 2024-02-21 ENCOUNTER — APPOINTMENT (OUTPATIENT)
Dept: PHYSICAL THERAPY | Facility: CLINIC | Age: 74
End: 2024-02-21
Payer: MEDICARE

## 2024-02-27 ENCOUNTER — HOSPITAL ENCOUNTER (OUTPATIENT)
Dept: PHYSICAL THERAPY | Facility: CLINIC | Age: 74
Setting detail: THERAPIES SERIES
Discharge: HOME | End: 2024-02-27
Payer: MEDICARE

## 2024-02-27 PROCEDURE — 97110 THERAPEUTIC EXERCISES: CPT

## 2024-02-27 NOTE — FLOWSHEET NOTE
[] [facility]  Outpatient Rehabilitation &  Therapy  [address]  [phone]  F: [phone] [] [facility]  Outpatient Rehabilitation &  Therapy  [address]  P: [phone]  F: [phone] [] [facility]  Outpatient Rehabilitation &  Therapy  [address]  P: [phone]  F: [phone] [x] [facility]  Outpatient Rehabilitation &  Therapy  [address]  P: [phone]  F: [phone] [] [facility]  Outpatient Rehabilitation &  Therapy  [address]   P: [phone]  F: [phone]  [] [facility]  Outpatient Rehabilitation &  Therapy  [address]   P: [phone]  F: [phone] [] [facility]  Outpatient Rehabilitation &  Therapy  [address]  P: [phone]  F: [phone] [] [facility]  Outpatient Rehabilitation &  Therapy  [address]  P: [phone]  F: [phone] [] [facility]  Outpatient Rehabilitation &  Therapy  [address]  P: [phone]  F: [phone]      Physical Therapy Daily Treatment Note    Date:  2024  Patient Name:  Sandra Anna    :  1950  MRN: [id number]  Physician: Alexandria Laguna PA-C                             Insurance: 1.Medicare; Alok yr; vs based on med nec; no auth req; 20% coins; ded met; no OOP limit  2.AARP Medicare Supp; Alok yr; follows primary; no pt resp due   Medical Diagnosis: M25.512 (ICD-10-CM) - Acute pain of left shoulder                  Rehab Codes: M25.512 (ICD-10-CM) - Acute pain of left shoulder   Onset Date: 2018                      Next 's appt: Pending     Visit# / total visits: ; Progress note for Medicare patient due at visit 10     Cancels/No Shows: 0    Subjective:    Pain:  [x] Yes  [] No Location: left shoulder Pain Rating: